# Patient Record
Sex: FEMALE | Race: WHITE | RURAL
[De-identification: names, ages, dates, MRNs, and addresses within clinical notes are randomized per-mention and may not be internally consistent; named-entity substitution may affect disease eponyms.]

---

## 2017-01-09 ENCOUNTER — OFFICE VISIT (OUTPATIENT)
Dept: INTERNAL MEDICINE CLINIC | Age: 32
End: 2017-01-09

## 2017-01-09 VITALS
SYSTOLIC BLOOD PRESSURE: 128 MMHG | TEMPERATURE: 96.9 F | OXYGEN SATURATION: 100 % | DIASTOLIC BLOOD PRESSURE: 91 MMHG | HEIGHT: 63 IN | BODY MASS INDEX: 20.09 KG/M2 | RESPIRATION RATE: 17 BRPM | WEIGHT: 113.4 LBS | HEART RATE: 145 BPM

## 2017-01-09 DIAGNOSIS — F90.9 ATTENTION DEFICIT HYPERACTIVITY DISORDER (ADHD), UNSPECIFIED ADHD TYPE: Chronic | ICD-10-CM

## 2017-01-09 RX ORDER — DIAZEPAM 2 MG/1
2 TABLET ORAL 2 TIMES DAILY
Qty: 60 TAB | Refills: 0 | Status: SHIPPED | OUTPATIENT
Start: 2017-01-09 | End: 2017-02-09 | Stop reason: SDUPTHER

## 2017-01-09 RX ORDER — DEXTROAMPHETAMINE SACCHARATE, AMPHETAMINE ASPARTATE, DEXTROAMPHETAMINE SULFATE AND AMPHETAMINE SULFATE 7.5; 7.5; 7.5; 7.5 MG/1; MG/1; MG/1; MG/1
30 TABLET ORAL 3 TIMES DAILY
Qty: 90 TAB | Refills: 0 | Status: SHIPPED | OUTPATIENT
Start: 2017-01-09 | End: 2017-02-09 | Stop reason: SDUPTHER

## 2017-01-09 NOTE — MR AVS SNAPSHOT
Visit Information Date & Time Provider Department Dept. Phone Encounter #  
 1/9/2017  1:30 PM Shannon Cruz NP [facility] [phone] [id number] Follow-up Instructions Return in about 2 months (around 3/9/2017) for ADHD. Upcoming Health Maintenance Date Due INFLUENZA AGE 9 TO ADULT 4/30/2017* Pneumococcal 19-64 Medium Risk (1 of 1 - PPSV23) 7/13/2017* DTaP/Tdap/Td series (1 - Tdap) 7/13/2017* PAP AKA CERVICAL CYTOLOGY 6/10/2018 *Topic was postponed. The date shown is not the original due date. Allergies as of 1/9/2017  Review Complete On: 1/9/2017 By: Shannon Cruz NP Severity Noted Reaction Type Reactions Latex  08/20/2015    Rash Lanolin High 09/09/2016    Hives Lamictal [Lamotrigine]  04/14/2010    Rash Tegretol [Carbamazepine]  03/18/2013    Rash Current Immunizations  Reviewed on 8/7/2014 No immunizations on file. Not reviewed this visit You Were Diagnosed With   
  
 Codes Comments Attention deficit hyperactivity disorder (ADHD), unspecified ADHD type     ICD-10-CM: F90.9 ICD-9-CM: 314.01 Vitals BP Pulse Temp Resp Height(growth percentile) Weight(growth percentile) (!) 128/91 (BP 1 Location: Left arm, BP Patient Position: Sitting) (!) 145 96.9 °F (36.1 °C) (Oral) 17 5' 3\" (1.6 m) 113 lb 6.4 oz (51.4 kg) SpO2 BMI OB Status Smoking Status 100% 20.09 kg/m2 Injection Current Some Day Smoker Vitals History BMI and BSA Data Body Mass Index Body Surface Area 20.09 kg/m 2 1.51 m 2 Preferred Pharmacy Pharmacy Name Phone [facility] [address] [facility], [address] [facility] [phone] Your Updated Medication List  
  
   
This list is accurate as of: 1/9/17  2:04 PM.  Always use your most recent med list.  
  
  
  
  
 betamethasone valerate 0.1 % ointment Commonly known as:  Marti Rucker  
 Apply  to affected area two (2) times a day. dextroamphetamine-amphetamine 30 mg tablet Commonly known as:  ADDERALL Take 1 Tab by mouth three (3) times daily. diazePAM 2 mg tablet Commonly known as:  VALIUM Take 1 Tab by mouth two (2) times a day. EPIDUO 0.1-2.5 % Gel Generic drug:  Adapalene-Benzoyl Peroxide 1 Tube by Apply Externally route once as needed for up to 1 dose. levocetirizine 5 mg tablet Commonly known as:  Figueroa GuÃ¡nica TAKE ONE TABLET BY MOUTH ONCE DAILY  
  
 multivitamin tablet Commonly known as:  ONE A DAY Take 1 Tab by mouth daily. pilocarpine 5 mg tablet Commonly known as:  Felicia Bentley Take 1 Tab by mouth three (3) times daily. Prescriptions Printed Refills  
 dextroamphetamine-amphetamine (ADDERALL) 30 mg tablet 0 Sig: Take 1 Tab by mouth three (3) times daily. Class: Print Route: Oral  
 diazePAM (VALIUM) 2 mg tablet 0 Sig: Take 1 Tab by mouth two (2) times a day. Class: Print Route: Oral  
  
Follow-up Instructions Return in about 2 months (around 3/9/2017) for ADHD. Introducing [facility] & HEALTH SERVICES! Dear Nyla Aguillon: Thank you for requesting a Runtastic account. Our records indicate that you already have an active Runtastic account. You can access your account anytime at [email or web address] Did you know that you can access your hospital and ER discharge instructions at any time in Runtastic? You can also review all of your test results from your hospital stay or ER visit. Additional Information If you have questions, please visit the Frequently Asked Questions section of the Runtastic website at [email or web address] Remember, Runtastic is NOT to be used for urgent needs. For medical emergencies, dial 911. Now available from your iPhone and Android! Please provide this summary of care documentation to your next provider. Your primary care clinician is listed as Jesse Guerrero. If you have any questions after today's visit, please call [phone].

## 2017-01-09 NOTE — PROGRESS NOTES
HISTORY OF PRESENT ILLNESS  Shahzad Galan is a 27 y.o. female. HPI  Chief Complaint   Patient presents with    Behavioral Problem     Medication Refill     Denies problems with taking medications. Denies weight loss or loss of appetite with taking medication. Elevated pulse  Patient admits to intake of marijuana. Denies chest pain or SOB. Review of Systems   Constitutional: Negative. Negative for chills and fever. HENT: Negative for congestion and ear pain. Eyes: Negative. Respiratory: Negative for cough and shortness of breath. Cardiovascular: Negative. Negative for chest pain and leg swelling. Gastrointestinal: Negative. Negative for heartburn, nausea and vomiting. Genitourinary: Negative. Negative for dysuria. Musculoskeletal: Negative for back pain, myalgias and neck pain. Skin: Negative. Neurological: Negative. Negative for headaches. Psychiatric/Behavioral: Negative. Physical Exam   Constitutional: She appears well-developed and well-nourished. No distress. HENT:   Head: Normocephalic and atraumatic. Eyes: Conjunctivae are normal.   Cardiovascular: Normal rate, regular rhythm, normal heart sounds and intact distal pulses. Exam reveals no gallop and no friction rub. No murmur heard. Pulmonary/Chest: Effort normal and breath sounds normal.   Skin: She is not diaphoretic. Nursing note and vitals reviewed. Body mass index is 20.09 kg/(m^2). Plan of care and AVS reviewed with patient who verbalized understanding. ASSESSMENT and PLAN    ICD-10-CM ICD-9-CM    1. Attention deficit hyperactivity disorder (ADHD), unspecified ADHD type F90.9 314.01 dextroamphetamine-amphetamine (ADDERALL) 30 mg tablet      diazePAM (VALIUM) 2 mg tablet      COMPLIANCE DRUG SCREEN/PRESCRIPTION MONITORING   Patient to give drug screen at next visit. Will check cmp at next visit.

## 2017-01-09 NOTE — PROGRESS NOTES
Chief Complaint   Patient presents with    Behavioral Problem     Medication Refill     1. Have you been to the ER, urgent care clinic since your last visit? Hospitalized since your last visit? No    2. Have you seen or consulted any other health care providers outside of the [facility] since your last visit? Include any pap smears or colon screening.  No

## 2017-01-14 LAB — DRUGS UR: NORMAL

## 2017-01-18 NOTE — PROGRESS NOTES
Patient was forthcoming about illegal drugs that would show in urine. Stated second urine check will be clean.

## 2017-01-31 ENCOUNTER — OFFICE VISIT (OUTPATIENT)
Dept: INTERNAL MEDICINE CLINIC | Age: 32
End: 2017-01-31

## 2017-01-31 VITALS
BODY MASS INDEX: 21.33 KG/M2 | SYSTOLIC BLOOD PRESSURE: 121 MMHG | DIASTOLIC BLOOD PRESSURE: 86 MMHG | HEIGHT: 63 IN | HEART RATE: 97 BPM | TEMPERATURE: 97.2 F | OXYGEN SATURATION: 98 % | WEIGHT: 120.4 LBS | RESPIRATION RATE: 18 BRPM

## 2017-01-31 NOTE — PROGRESS NOTES
Chief Complaint   Patient presents with    Cold Symptoms     nasal & chest congestion, losing voice     Morning meds taken this Barbra Healy LPN

## 2017-01-31 NOTE — MR AVS SNAPSHOT
Visit Information Date & Time Provider Department Dept. Phone Encounter #  
 1/31/2017 10:00 AM Lisseth Sifuentes NP [facility] [phone] [id number] Follow-up Instructions Return in about 5 weeks (around 3/9/2017) for physical exam. Upcoming Health Maintenance Date Due INFLUENZA AGE 9 TO ADULT 4/30/2017* Pneumococcal 19-64 Medium Risk (1 of 1 - PPSV23) 7/13/2017* DTaP/Tdap/Td series (1 - Tdap) 7/13/2017* PAP AKA CERVICAL CYTOLOGY 6/10/2018 *Topic was postponed. The date shown is not the original due date. Allergies as of 1/31/2017  Review Complete On: 1/31/2017 By: Lisseth Sifuentes NP Severity Noted Reaction Type Reactions Latex  08/20/2015    Rash Lanolin High 09/09/2016    Hives Lamictal [Lamotrigine]  04/14/2010    Rash Tegretol [Carbamazepine]  03/18/2013    Rash Current Immunizations  Reviewed on 8/7/2014 No immunizations on file. Not reviewed this visit You Were Diagnosed With   
  
 Codes Comments Cold    -  Primary ICD-10-CM: Javy Catching ICD-9-CM: 620 Vitals BP Pulse Temp Resp Height(growth percentile) Weight(growth percentile) 121/86 (BP 1 Location: Right arm, BP Patient Position: Sitting) 97 97.2 °F (36.2 °C) (Oral) 18 5' 3\" (1.6 m) 120 lb 6.4 oz (54.6 kg) SpO2 BMI OB Status Smoking Status 98% 21.33 kg/m2 Injection Current Some Day Smoker Vitals History BMI and BSA Data Body Mass Index Body Surface Area  
 21.33 kg/m 2 1.56 m 2 Preferred Pharmacy Pharmacy Name Phone [facility] [address] [facility], [address] [facility] [phone] Your Updated Medication List  
  
   
This list is accurate as of: 1/31/17 11:05 AM.  Always use your most recent med list. CHANG-SELTZER PLUS-D SINUS-COLD 2-30- mg Cap Generic drug:  cey-rndkxqhkg-cp-acetaminophen Take as directed  
  
 betamethasone valerate 0.1 % ointment Commonly known as:  Jhonny Nelson Apply  to affected area two (2) times a day. dextroamphetamine-amphetamine 30 mg tablet Commonly known as:  ADDERALL Take 1 Tab by mouth three (3) times daily. diazePAM 2 mg tablet Commonly known as:  VALIUM Take 1 Tab by mouth two (2) times a day. levocetirizine 5 mg tablet Commonly known as:  Vidhya Grumbles TAKE ONE TABLET BY MOUTH ONCE DAILY  
  
 multivitamin tablet Commonly known as:  ONE A DAY Take 1 Tab by mouth daily. pilocarpine 5 mg tablet Commonly known as:  Baljeet Jac Take 1 Tab by mouth three (3) times daily. Follow-up Instructions Return in about 5 weeks (around 3/9/2017) for physical exam.  
  
  
Introducing [facility] SERVICES! Dear Art Cordero: Thank you for requesting a Square account. Our records indicate that you already have an active Square account. You can access your account anytime at [email or web address] Did you know that you can access your hospital and ER discharge instructions at any time in Square? You can also review all of your test results from your hospital stay or ER visit. Additional Information If you have questions, please visit the Frequently Asked Questions section of the Square website at [email or web address] Remember, Square is NOT to be used for urgent needs. For medical emergencies, dial 911. Now available from your iPhone and Android! Please provide this summary of care documentation to your next provider. Your primary care clinician is listed as Adriana Rascon. If you have any questions after today's visit, please call [phone].

## 2017-01-31 NOTE — PROGRESS NOTES
HISTORY OF PRESENT ILLNESS  Virgil Galan is a 27 y.o. female. HPI  Chief Complaint   Patient presents with    Cold Symptoms     nasal & chest congestion, losing voice     States has been having Sx x 1 week. STates has not been using any OTC medications that have been effective. Review of Systems   Constitutional: Positive for chills. Negative for fever. HENT: Positive for congestion. Negative for ear pain and sore throat. Respiratory: Negative for cough. Gastrointestinal: Negative for diarrhea, nausea and vomiting. Physical Exam   Constitutional: She is oriented to person, place, and time. She appears well-developed and well-nourished. No distress. HENT:   Head: Normocephalic and atraumatic. Eyes: Conjunctivae are normal.   Cardiovascular: Normal rate, regular rhythm, normal heart sounds and intact distal pulses. Exam reveals no gallop and no friction rub. No murmur heard. Pulmonary/Chest: Effort normal and breath sounds normal. No respiratory distress. She has no wheezes. She has no rales. Musculoskeletal: Normal range of motion. Neurological: She is alert and oriented to person, place, and time. Skin: Skin is warm and dry. She is not diaphoretic. Nursing note and vitals reviewed. Plan of care and AVS reviewed with patient who verbalized understanding. ASSESSMENT and PLAN    ICD-10-CM ICD-9-CM    1. Cold J00 460 tsk-wewwvfqlj-rd-acetaminophen (CHANG-SELTZER PLUS-D SINUS-COLD) 2-30- mg cap   2. Normal body mass index (BMI) Z78.9 V49.89    Encouraged to keep well hydrated take chang-seltzer asprescribed.   F/u 2 months physical exam.

## 2017-02-09 DIAGNOSIS — F90.9 ATTENTION DEFICIT HYPERACTIVITY DISORDER (ADHD), UNSPECIFIED ADHD TYPE: Chronic | ICD-10-CM

## 2017-02-09 RX ORDER — DIAZEPAM 2 MG/1
2 TABLET ORAL 2 TIMES DAILY
Qty: 60 TAB | Refills: 0 | Status: SHIPPED | OUTPATIENT
Start: 2017-02-09 | End: 2017-03-09 | Stop reason: SDUPTHER

## 2017-02-09 RX ORDER — DEXTROAMPHETAMINE SACCHARATE, AMPHETAMINE ASPARTATE, DEXTROAMPHETAMINE SULFATE AND AMPHETAMINE SULFATE 7.5; 7.5; 7.5; 7.5 MG/1; MG/1; MG/1; MG/1
30 TABLET ORAL 3 TIMES DAILY
Qty: 90 TAB | Refills: 0 | Status: SHIPPED | OUTPATIENT
Start: 2017-02-09 | End: 2017-03-09 | Stop reason: SDUPTHER

## 2017-03-09 ENCOUNTER — OFFICE VISIT (OUTPATIENT)
Dept: INTERNAL MEDICINE CLINIC | Age: 32
End: 2017-03-09

## 2017-03-09 VITALS
WEIGHT: 122.2 LBS | TEMPERATURE: 97.2 F | HEIGHT: 63 IN | BODY MASS INDEX: 21.65 KG/M2 | HEART RATE: 66 BPM | DIASTOLIC BLOOD PRESSURE: 70 MMHG | SYSTOLIC BLOOD PRESSURE: 124 MMHG | RESPIRATION RATE: 18 BRPM | OXYGEN SATURATION: 100 %

## 2017-03-09 DIAGNOSIS — F90.8 ATTENTION-DEFICIT HYPERACTIVITY DISORDER, OTHER TYPE: ICD-10-CM

## 2017-03-09 DIAGNOSIS — Z00.00 PHYSICAL EXAM: Primary | ICD-10-CM

## 2017-03-09 RX ORDER — DEXTROAMPHETAMINE SACCHARATE, AMPHETAMINE ASPARTATE, DEXTROAMPHETAMINE SULFATE AND AMPHETAMINE SULFATE 7.5; 7.5; 7.5; 7.5 MG/1; MG/1; MG/1; MG/1
30 TABLET ORAL 3 TIMES DAILY
Qty: 90 TAB | Refills: 0 | Status: SHIPPED | OUTPATIENT
Start: 2017-03-09 | End: 2018-07-13 | Stop reason: SDUPTHER

## 2017-03-09 RX ORDER — DIAZEPAM 2 MG/1
2 TABLET ORAL DAILY
Qty: 30 TAB | Refills: 0 | Status: SHIPPED | OUTPATIENT
Start: 2017-03-09 | End: 2018-07-13 | Stop reason: SDUPTHER

## 2017-03-09 NOTE — PROGRESS NOTES
HISTORY OF PRESENT ILLNESS  Zain Galan is a 27 y.o. female. HPI  Chief Complaint   Patient presents with    Physical      No medical complaints. Requesting refills for medications. Has given urine for compliance today. Take santiago and vitamin c.  States is vegan and runs once weekly with yoga and stretching daily. Review of Systems   Constitutional: Negative. Negative for chills and fever. HENT: Negative. Negative for congestion, ear pain and sore throat. Eyes: Negative. Respiratory: Negative. Negative for cough, shortness of breath and wheezing. Cardiovascular: Negative. Negative for palpitations and leg swelling. Genitourinary:          Tenderness of breast related to menstrual cycle   Musculoskeletal: Negative for back pain and myalgias. Skin:        Has rash that is going away on right side of neck related to metal allergies. Neurological: Negative. Negative for dizziness. Psychiatric/Behavioral: Negative for suicidal ideas. Physical Exam   Constitutional: She is oriented to person, place, and time. She appears well-developed and well-nourished. No distress. HENT:   Head: Normocephalic and atraumatic. Cardiovascular: Normal rate, regular rhythm and normal heart sounds. Pulmonary/Chest: Effort normal and breath sounds normal.   Abdominal: Soft. She exhibits no distension. There is no tenderness. There is no rebound and no guarding. Neurological: She is alert and oriented to person, place, and time. She has normal reflexes. Skin: Skin is warm and dry. She is not diaphoretic. Positive for erythema right side of neck   Nursing note and vitals reviewed. Plan of care and AVS reviewed with patient who verbalized understanding. ASSESSMENT and PLAN    ICD-10-CM ICD-9-CM    1. Physical exam S48.51 M12.3 METABOLIC PANEL, COMPREHENSIVE      RI HANDLG&/OR CONVEY OF SPEC FOR TR OFFICE TO LAB      RI COLLECTION VENOUS BLOOD,VENIPUNCTURE   2.  Attention-deficit hyperactivity disorder, other type F90.8 314.01 COMPLIANCE DRUG SCREEN/PRESCRIPTION MONITORING      dextroamphetamine-amphetamine (ADDERALL) 30 mg tablet      diazePAM (VALIUM) 2 mg tablet      ME HANDLG&/OR CONVEY OF SPEC FOR TR OFFICE TO LAB      ME COLLECTION VENOUS BLOOD,VENIPUNCTURE   Will notify patient of lab results. F/U 2 months.

## 2017-03-09 NOTE — MR AVS SNAPSHOT
Visit Information Date & Time Provider Department Dept. Phone Encounter #  
 3/9/2017  9:30 AM Judi Rashid NP [facility] [phone] [id number] Follow-up Instructions Return in about 2 months (around 5/9/2017) for ADHD. Upcoming Health Maintenance Date Due Pneumococcal 19-64 Medium Risk (1 of 1 - PPSV23) 7/13/2017* DTaP/Tdap/Td series (1 - Tdap) 7/13/2017* PAP AKA CERVICAL CYTOLOGY 6/10/2018 *Topic was postponed. The date shown is not the original due date. Allergies as of 3/9/2017  Review Complete On: 3/9/2017 By: Judi Rashid NP Severity Noted Reaction Type Reactions Latex  08/20/2015    Rash Lanolin High 09/09/2016    Hives Lamictal [Lamotrigine]  04/14/2010    Rash Tegretol [Carbamazepine]  03/18/2013    Rash Current Immunizations  Reviewed on 8/7/2014 No immunizations on file. Not reviewed this visit You Were Diagnosed With   
  
 Codes Comments Physical exam    -  Primary ICD-10-CM: Z00.00 ICD-9-CM: V70.9 Attention-deficit hyperactivity disorder, other type     ICD-10-CM: F90.8 ICD-9-CM: 314.01 Attention deficit hyperactivity disorder (ADHD), unspecified ADHD type     ICD-10-CM: F90.9 ICD-9-CM: 314.01 Vitals BP Pulse Temp Resp Height(growth percentile) Weight(growth percentile) 124/70 (BP 1 Location: Left arm, BP Patient Position: Sitting) 66 97.2 °F (36.2 °C) (Oral) 18 5' 3\" (1.6 m) 122 lb 3.2 oz (55.4 kg) LMP SpO2 BMI OB Status Smoking Status 02/17/2017 (Exact Date) 100% 21.65 kg/m2 Having regular periods Current Some Day Smoker Vitals History BMI and BSA Data Body Mass Index Body Surface Area  
 21.65 kg/m 2 1.57 m 2 Preferred Pharmacy Pharmacy Name Phone [facility] [address] [facility], [facility] [address] [facility] [phone] Your Updated Medication List  
  
   
 This list is accurate as of: 3/9/17 10:12 AM.  Always use your most recent med list. CHANG-PRAVEENNAYELIER PLUS-D SINUS-COLD 2-30- mg Cap Generic drug:  xiy-gkvzmyfef-di-acetaminophen Take as directed  
  
 betamethasone valerate 0.1 % ointment Commonly known as:  Darrall Jennifer Apply  to affected area two (2) times a day. dextroamphetamine-amphetamine 30 mg tablet Commonly known as:  ADDERALL Take 1 Tab by mouth three (3) times daily. diazePAM 2 mg tablet Commonly known as:  VALIUM Take 1 Tab by mouth daily. Max Daily Amount: 2 mg.  
  
 levocetirizine 5 mg tablet Commonly known as:  Rochelle Roby TAKE ONE TABLET BY MOUTH ONCE DAILY  
  
 multivitamin tablet Commonly known as:  ONE A DAY Take 1 Tab by mouth daily. pilocarpine 5 mg tablet Commonly known as:  Voncile Dynes Take 1 Tab by mouth three (3) times daily. Prescriptions Printed Refills  
 dextroamphetamine-amphetamine (ADDERALL) 30 mg tablet 0 Sig: Take 1 Tab by mouth three (3) times daily. Class: Print Route: Oral  
 diazePAM (VALIUM) 2 mg tablet 0 Sig: Take 1 Tab by mouth daily. Max Daily Amount: 2 mg. Class: Print Route: Oral  
  
We Performed the Following [address] MONITORING [[id number] Custom] METABOLIC PANEL, COMPREHENSIVE [79048 CPT(R)] Follow-up Instructions Return in about 2 months (around 5/9/2017) for ADHD. Introducing \[facility]\"" & HEALTH SERVICES! Dear Keyla Kennedy: Thank you for requesting a GeoVario account. Our records indicate that you already have an active GeoVario account. You can access your account anytime at [email or web address] Did you know that you can access your hospital and ER discharge instructions at any time in GeoVario? You can also review all of your test results from your hospital stay or ER visit. Additional Information If you have questions, please visit the Frequently Asked Questions section of the [employer]ion The[employer] website at [email or web address] Remember, Social [employer] Beyond is NOT to be used for urgent needs. For medical emergencies, dial 911. Now available from your iPhone and Android! Please provide this summary of care documentation to your next provider. Your primary care clinician is listed as Joie Alanis. If you have any questions after today's visit, please call [phone].

## 2017-03-09 NOTE — PROGRESS NOTES
Chief Complaint   Patient presents with    Well Woman     1. Have you been to the ER, urgent care clinic since your last visit? Hospitalized since your last visit? No    2. Have you seen or consulted any other health care providers outside of the [facility] since your last visit? Include any pap smears or colon screening. No     There are no preventive care reminders to display for this patient. Patient refuses flu vaccine at present.

## 2017-03-15 LAB
ALBUMIN SERPL-MCNC: 4.8 G/DL (ref 3.5–5.5)
ALBUMIN/GLOB SERPL: 1.8 {RATIO} (ref 1.1–2.5)
ALP SERPL-CCNC: 81 IU/L (ref 39–117)
ALT SERPL-CCNC: 20 IU/L (ref 0–32)
AST SERPL-CCNC: 21 IU/L (ref 0–40)
BILIRUB SERPL-MCNC: <0.2 MG/DL (ref 0–1.2)
BUN SERPL-MCNC: 8 MG/DL (ref 6–20)
BUN/CREAT SERPL: 17 (ref 8–20)
CALCIUM SERPL-MCNC: 9.5 MG/DL (ref 8.7–10.2)
CHLORIDE SERPL-SCNC: 100 MMOL/L (ref 96–106)
CO2 SERPL-SCNC: 23 MMOL/L (ref 18–29)
CREAT SERPL-MCNC: 0.47 MG/DL (ref 0.57–1)
DRUGS UR: NORMAL
GLOBULIN SER CALC-MCNC: 2.7 G/DL (ref 1.5–4.5)
GLUCOSE SERPL-MCNC: 95 MG/DL (ref 65–99)
POTASSIUM SERPL-SCNC: 4.6 MMOL/L (ref 3.5–5.2)
PROT SERPL-MCNC: 7.5 G/DL (ref 6–8.5)
SODIUM SERPL-SCNC: 139 MMOL/L (ref 134–144)

## 2018-06-13 ENCOUNTER — OFFICE VISIT (OUTPATIENT)
Dept: INTERNAL MEDICINE CLINIC | Age: 33
End: 2018-06-13

## 2018-06-13 ENCOUNTER — HOSPITAL ENCOUNTER (OUTPATIENT)
Dept: LAB | Age: 33
Discharge: HOME | End: 2018-06-13
Payer: MEDICAID

## 2018-06-13 VITALS
HEIGHT: 63 IN | RESPIRATION RATE: 16 BRPM | SYSTOLIC BLOOD PRESSURE: 117 MMHG | DIASTOLIC BLOOD PRESSURE: 72 MMHG | OXYGEN SATURATION: 100 % | BODY MASS INDEX: 20.55 KG/M2 | WEIGHT: 116 LBS

## 2018-06-13 DIAGNOSIS — Z01.419 WELL WOMAN EXAM WITH ROUTINE GYNECOLOGICAL EXAM: ICD-10-CM

## 2018-06-13 DIAGNOSIS — Z72.0 TOBACCO ABUSE: ICD-10-CM

## 2018-06-13 DIAGNOSIS — F31.81 BIPOLAR 2 DISORDER (HCC): Chronic | ICD-10-CM

## 2018-06-13 DIAGNOSIS — Z30.09 FAMILY PLANNING: ICD-10-CM

## 2018-06-13 DIAGNOSIS — Z12.4 SCREENING FOR MALIGNANT NEOPLASM OF CERVIX: ICD-10-CM

## 2018-06-13 DIAGNOSIS — Z23 ENCOUNTER FOR IMMUNIZATION: ICD-10-CM

## 2018-06-13 DIAGNOSIS — Z00.00 ANNUAL PHYSICAL EXAM: Primary | ICD-10-CM

## 2018-06-13 DIAGNOSIS — Z20.2 EXPOSURE TO SEXUALLY TRANSMITTED DISEASE (STD): ICD-10-CM

## 2018-06-13 DIAGNOSIS — Z30.011 ENCOUNTER FOR INITIAL PRESCRIPTION OF CONTRACEPTIVE PILLS: ICD-10-CM

## 2018-06-13 DIAGNOSIS — F90.0 ATTENTION DEFICIT HYPERACTIVITY DISORDER (ADHD), PREDOMINANTLY INATTENTIVE TYPE: Chronic | ICD-10-CM

## 2018-06-13 LAB
HCG URINE, QL. (POC): NEGATIVE
VALID INTERNAL CONTROL?: YES

## 2018-06-13 PROCEDURE — 88175 CYTOPATH C/V AUTO FLUID REDO: CPT | Performed by: FAMILY MEDICINE

## 2018-06-13 RX ORDER — NORGESTIMATE AND ETHINYL ESTRADIOL 0.25-0.035
1 KIT ORAL DAILY
Qty: 1 PACKAGE | Refills: 11 | Status: SHIPPED | OUTPATIENT
Start: 2018-06-13 | End: 2022-11-01

## 2018-06-13 RX ORDER — VARENICLINE TARTRATE 1 MG/1
1 TABLET, FILM COATED ORAL 2 TIMES DAILY
Qty: 60 TAB | Refills: 2 | Status: SHIPPED | OUTPATIENT
Start: 2018-06-13 | End: 2018-07-18

## 2018-06-13 RX ORDER — VARENICLINE TARTRATE 25 MG
KIT ORAL
Qty: 1 DOSE PACK | Refills: 0 | Status: SHIPPED | OUTPATIENT
Start: 2018-06-13 | End: 2018-07-18

## 2018-06-13 NOTE — MR AVS SNAPSHOT
[address] [place] [address]. [address] 3643 [facility] 
[phone] Patient: Jose L Galan MRN: [id number] :1985 Visit Information Date & Time Provider Department Dept. Phone Encounter #  
 2018  3:30 PM Nory Anderson  Verona Soliman [id number] Follow-up Instructions Return in about 6 weeks (around 2018) for nurse visit. Upcoming Health Maintenance Date Due Pneumococcal 19-64 Medium Risk (1 of 1 - PPSV23) 10/26/2003 DTaP/Tdap/Td series (1 - Tdap) 10/26/2005 PAP AKA CERVICAL CYTOLOGY 6/10/2018 Influenza Age 5 to Adult 2018 Allergies as of 2018  Review Complete On: 2018 By: Mercy Odell LPN Severity Noted Reaction Type Reactions Latex  2015    Rash Lanolin High 2016    Hives Lamictal [Lamotrigine]  2010    Rash Tegretol [Carbamazepine]  2013    Rash Current Immunizations  Reviewed on 2014 Name Date Hep B Vaccine (Adult)  Incomplete Not reviewed this visit You Were Diagnosed With   
  
 Codes Comments Annual physical exam    -  Primary ICD-10-CM: Z00.00 ICD-9-CM: V70.0 Bipolar 2 disorder (HCC)     ICD-10-CM: F31.81 
ICD-9-CM: 296.89 Attention deficit hyperactivity disorder (ADHD), predominantly inattentive type     ICD-10-CM: F90.0 ICD-9-CM: 314.00 Tobacco abuse     ICD-10-CM: Z72.0 ICD-9-CM: 305.1 Family planning     ICD-10-CM: Z30.09 
ICD-9-CM: V25.09 Exposure to sexually transmitted disease (STD)     ICD-10-CM: Z20.2 ICD-9-CM: V01.6 Encounter for immunization     ICD-10-CM: V38 ICD-9-CM: V03.89 Vitals BP Resp Height(growth percentile) Weight(growth percentile) SpO2 BMI  
 117/72 (BP 1 Location: Left arm, BP Patient Position: Sitting) 16 5' 3\" (1.6 m) 116 lb (52.6 kg) 100% 20.55 kg/m2 OB Status Smoking Status Having regular periods Current Some Day Smoker BMI and BSA Data Body Mass Index Body Surface Area 20.55 kg/m 2 1.53 m 2 Preferred Pharmacy Pharmacy Name Phone [facility] [phone] [facility] [phone] Your Updated Medication List  
  
   
This list is accurate as of 6/13/18  4:49 PM.  Always use your most recent med list. CHANG-SELTZER PLUS-D SINUS-COLD 2-30- mg Cap Generic drug:  npq-xvownicni-os-acetaminophen Take as directed  
  
 betamethasone valerate 0.1 % ointment Commonly known as:  Deyanne Gill Apply  to affected area two (2) times a day. dextroamphetamine-amphetamine 30 mg tablet Commonly known as:  ADDERALL Take 1 Tab by mouth three (3) times daily. diazePAM 2 mg tablet Commonly known as:  VALIUM Take 1 Tab by mouth daily. Max Daily Amount: 2 mg.  
  
 levocetirizine 5 mg tablet Commonly known as:  Massiel Martyr TAKE ONE TABLET BY MOUTH ONCE DAILY  
  
 multivitamin tablet Commonly known as:  ONE A DAY Take 1 Tab by mouth daily. norgestimate-ethinyl estradiol 0.25-35 mg-mcg Tab Commonly known as:  Benjamin Robinsons Take 1 Tab by mouth daily. pilocarpine 5 mg tablet Commonly known as:  Agustin Day Take 1 Tab by mouth three (3) times daily. * varenicline 0.5 mg (11)- 1 mg (42) Dspk Commonly known as:  [address] As directed * varenicline 1 mg tablet Commonly known as:  CHANTIX CONTINUING MONTH TINY Take 1 Tab by mouth two (2) times a day. * Notice: This list has 2 medication(s) that are the same as other medications prescribed for you. Read the directions carefully, and ask your doctor or other care provider to review them with you. Prescriptions Sent to Pharmacy Refills  
 varenicline (CHANTIX STARTING MONTH BOX) 0.5 mg (11)- 1 mg (42) DsPk 0 Sig: As directed  Class: Normal  
 Pharmacy: [facility] [address] [facility] [address] & [facility] Ph #: [phone]  
 varenicline (CHANTIX CONTINUING MONTH PAK) 1 mg tablet 2 Sig: Take 1 Tab by mouth two (2) times a day. Class: Normal  
 Pharmacy: [facility] [address] [facility] [facility] [address] Ph #: [phone] Route: Oral  
 norgestimate-ethinyl estradiol (ORTHO-CYCLEN, SPRINTEC) 0.25-35 mg-mcg tab 11 Sig: Take 1 Tab by mouth daily. Class: Normal  
 Pharmacy: [facility] [address] [facility] [facility] [address] & [facility] Ph #: [phone] Route: Oral  
  
We Performed the Following AMB POC URINE PREGNANCY TEST, VISUAL COLOR COMPARISON [43785 CPT(R)] CT/NG/T.VAGINALIS AMPLIFICATION [id number] CPT(R)] HEP B SURFACE AG [id number] CPT(R)] HEPATITIS B VACCINE, ADULT DOSAGE, IM [55470 CPT(R)] HEPATITIS C AB [75041 CPT(R)] HIV 2 AB W/REFL IB [86044 CPT(R)] PAP, IG, RFX HPV ASCUS (552912) [31278 CPT(R)] RPR [74542 CPT(R)] Follow-up Instructions Return in about 6 weeks (around 7/25/2018) for nurse visit. Patient Instructions Deciding About Using Medicines To Quit Smoking Deciding About Using Medicines To Quit Smoking What are the medicines you can use? Your doctor may prescribe varenicline (Chantix) or bupropion (Zyban). These medicines can help you cope with cravings for tobacco. They are pills that don't contain nicotine. You also can use nicotine replacement products. These do contain nicotine. There are many types. · Gum and lozenges slowly release nicotine into your mouth. · Patches stick to your skin. They slowly release nicotine into your bloodstream. 
· An inhaler has a vernon that contains nicotine. You breathe in a puff of nicotine vapor through your mouth and throat. · Nasal spray releases a mist that contains nicotine. What are key points about this decision? · Using medicines can double your chances of quitting smoking.  They can ease cravings and withdrawal symptoms. · Getting counseling along with using medicine can raise your chances of quitting even more. · If you smoke fewer than 5 cigarettes a day, you may not need medicines to help you quit smoking. · These medicines have less nicotine than cigarettes. And by itself, nicotine is not nearly as harmful as smoking. The tars, carbon monoxide, and other toxic chemicals in tobacco cause the harmful effects. · The side effects of nicotine replacement products depend on the type of product. For example, a patch can make your skin red and itchy. Medicines in pill form can make you sick to your stomach. They can also cause dry mouth and trouble sleeping. For most people, the side effects are not bad enough to make them stop using the products. Why might you choose to use medicines to quit smoking? · You have tried on your own to stop smoking, but you were not able to stop. · You smoke more than 5 cigarettes a day. · You want to increase your chances of quitting smoking. · You want to reduce your cravings and withdrawal symptoms. · You feel the benefits of medicine outweigh the side effects. Why might you choose not to use medicine? · You want to try quitting on your own by stopping all at once (\"cold turkey\"). · You want to cut back slowly on the number of cigarettes you smoke. · You smoke fewer than 5 cigarettes a day. · You do not like using medicine. · You feel the side effects of medicines outweigh the benefits. · You are worried about the cost of medicines. Your decision Thinking about the facts and your feelings can help you make a decision that is right for you. Be sure you understand the benefits and risks of your options, and think about what else you need to do before you make the decision. Where can you learn more? Go to http://francesca-cal.info/.  
Enter T370 in the search box to learn more about \"Deciding About Using Medicines To Quit Smoking. \" Current as of: March 20, 2017 Content Version: 11.4 © 5179-7820 BioIQ. Care instructions adapted under license by [employer]Cred (which disclaims liability or warranty for this information). If you have questions about a medical condition or this instruction, always ask your healthcare professional. [address] any warranty or liability for your use of this information. Combination Birth Control Pills: Care Instructions Your Care Instructions Combination birth control pills are used to prevent pregnancy. They give you a regular dose of the hormones estrogen and progestin. You take a hormone pill every day to prevent pregnancy. Birth control pills come in packs. The most common type has 3 weeks of hormone pills. Some packs have sugar pills (they do not contain any hormones) for the fourth week. During that fourth no-hormone week, you have your period. After the fourth week (28 days), you start a new pack. Some birth control pills are packaged in different ways. For example, some have hormone pills for the fourth week instead of sugar pills. Taking hormones for the entire month causes you to not have periods or to have fewer periods. Others are packaged so that you have a period every 3 months. Your doctor will tell you what type of pills you have. Follow-up care is a key part of your treatment and safety. Be sure to make and go to all appointments, and call your doctor if you are having problems. It's also a good idea to know your test results and keep a list of the medicines you take. How can you care for yourself at home? How do you take the pill? · Follow your doctor's instructions about when to start taking your pills. Use backup birth control, such as a condom, or don't have intercourse for 7 days after you start your pills. · Take your pills every day, at about the same time of day.  To help yourself do this, try to take them when you do something else every day, such as brushing your teeth. What if you forget to take a pill? Always read the label for specific instructions, or call your doctor. Here are some basic guidelines: · If you miss 1 hormone pill, take it as soon as you remember. Ask your doctor if you may need to use a backup birth control method, such as a condom, or not have intercourse. · If you miss 2 or more hormone pills, take one as soon as you remember you forgot them. Then read the pill label or call your doctor about instructions on how to take your missed pills. Use a backup method of birth control or don't have intercourse for 7 days. Pregnancy is more likely if you miss more than 1 pill. · If you had intercourse, you can use emergency contraception, such as the morning-after pill (Plan B). You can use emergency contraception for up to 5 days after having had intercourse, but it works best if you take it right away. What else do you need to know? · The pill has side effects. ¨ You may have very light or skipped periods. ¨ You may have bleeding between periods (spotting). This usually decreases after 3 to 4 months. ¨ You may have mood changes, less interest in sex, or weight gain. · The pill may reduce acne, heavy bleeding and cramping, and symptoms of premenstrual syndrome. · Check with your doctor before you use any other medicines, including over-the-counter medicines, vitamins, herbal products, and supplements. Birth control hormones may not work as well to prevent pregnancy when combined with other medicines. · The pill doesn't protect against sexually transmitted infection (STIs), such as herpes or HIV/AIDS. If you're not sure whether your sex partner might have an STI, use a condom to protect against disease. When should you call for help? Call your doctor now or seek immediate medical care if: 
? · You have severe belly pain. ? · You have signs of a blood clot, such as: 
¨ Pain in your calf, back of the knee, thigh, or groin. ¨ Redness and swelling in your leg or groin. ? · You have blurred vision or other problems seeing. ? · You have a severe headache. ? · You have severe trouble breathing. ? Watch closely for changes in your health, and be sure to contact your doctor if: 
? · You think you might be pregnant. ? · You think you may be depressed. ? · You think you may have been exposed to or have a sexually transmitted infection. Where can you learn more? Go to http://francesca-cal.info/. Enter N021 in the search box to learn more about \"Combination Birth Control Pills: Care Instructions. \" Current as of: March 16, 2017 Content Version: 11.4 © 1097-1870 Connectipity. Care instructions adapted under license by [employer]i (which disclaims liability or warranty for this information). If you have questions about a medical condition or this instruction, always ask your healthcare professional. [place] [address] any warranty or liability for your use of this information. Well Visit, Ages 25 to 48: Care Instructions Your Care Instructions Physical exams can help you stay healthy. Your doctor has checked your overall health and may have suggested ways to take good care of yourself. He or she also may have recommended tests. At home, you can help prevent illness with healthy eating, regular exercise, and other steps. Follow-up care is a key part of your treatment and safety. Be sure to make and go to all appointments, and call your doctor if you are having problems. It's also a good idea to know your test results and keep a list of the medicines you take. How can you care for yourself at home? · Reach and stay at a healthy weight. This will lower your risk for many problems, such as obesity, diabetes, heart disease, and high blood pressure. · Get at least 30 minutes of physical activity on most days of the week. Walking is a good choice. You also may want to do other activities, such as running, swimming, cycling, or playing tennis or team sports. Discuss any changes in your exercise program with your doctor. · Do not smoke or allow others to smoke around you. If you need help quitting, talk to your doctor about stop-smoking programs and medicines. These can increase your chances of quitting for good. · Talk to your doctor about whether you have any risk factors for sexually transmitted infections (STIs). Having one sex partner (who does not have STIs and does not have sex with anyone else) is a good way to avoid these infections. · Use birth control if you do not want to have children at this time. Talk with your doctor about the choices available and what might be best for you. · Protect your skin from too much sun. When you're outdoors from 10 a.m. to 4 p.m., stay in the shade or cover up with clothing and a hat with a wide brim. Wear sunglasses that block UV rays. Even when it's cloudy, put broad-spectrum sunscreen (SPF 30 or higher) on any exposed skin. · See a dentist one or two times a year for checkups and to have your teeth cleaned. · Wear a seat belt in the car. · Drink alcohol in moderation, if at all. That means no more than 2 drinks a day for men and 1 drink a day for women. Follow your doctor's advice about when to have certain tests. These tests can spot problems early. For everyone · Cholesterol. Have the fat (cholesterol) in your blood tested after age 21. Your doctor will tell you how often to have this done based on your age, family history, or other things that can increase your risk for heart disease. · Blood pressure. Have your blood pressure checked during a routine doctor visit. Your doctor will tell you how often to check your blood pressure based on your age, your blood pressure results, and other factors. · Vision. Talk with your doctor about how often to have a glaucoma test. 
· Diabetes. Ask your doctor whether you should have tests for diabetes. · Colon cancer. Have a test for colon cancer at age 48. You may have one of several tests. If you are younger than 48, you may need a test earlier if you have any risk factors. Risk factors include whether you already had a precancerous polyp removed from your colon or whether your parent, brother, sister, or child has had colon cancer. For women · Breast exam and mammogram. Talk to your doctor about when you should have a clinical breast exam and a mammogram. Medical experts differ on whether and how often women under 50 should have these tests. Your doctor can help you decide what is right for you. · Pap test and pelvic exam. Begin Pap tests at age 24. A Pap test is the best way to find cervical cancer. The test often is part of a pelvic exam. Ask how often to have this test. 
· Tests for sexually transmitted infections (STIs). Ask whether you should have tests for STIs. You may be at risk if you have sex with more than one person, especially if your partners do not wear condoms. For men · Tests for sexually transmitted infections (STIs). Ask whether you should have tests for STIs. You may be at risk if you have sex with more than one person, especially if you do not wear a condom. · Testicular cancer exam. Ask your doctor whether you should check your testicles regularly. · Prostate exam. Talk to your doctor about whether you should have a blood test (called a PSA test) for prostate cancer. Experts differ on whether and when men should have this test. Some experts suggest it if you are older than 39 and are -American or have a father or brother who got prostate cancer when he was younger than 72. When should you call for help?  
Watch closely for changes in your health, and be sure to contact your doctor if you have any problems or symptoms that concern you. Where can you learn more? Go to http://francesca-cal.info/. Enter P072 in the search box to learn more about \"Well Visit, Ages 25 to 48: Care Instructions. \" Current as of: May 12, 2017 Content Version: 11.4 © 7392-9606 Dacos Software. Care instructions adapted under license by [employer] (which disclaims liability or warranty for this information). If you have questions about a medical condition or this instruction, always ask your healthcare professional. [address] any warranty or liability for your use of this information. Introducing [facility]! Dear Michelle Marquez: Thank you for requesting a WorkHound account. Our records indicate that you already have an active WorkHound account. You can access your account anytime at [email or web address] Did you know that you can access your hospital and ER discharge instructions at any time in WorkHound? You can also review all of your test results from your hospital stay or ER visit. Additional Information If you have questions, please visit the Frequently Asked Questions section of the WorkHound website at [email or web address] Remember, WorkHound is NOT to be used for urgent needs. For medical emergencies, dial 911. Now available from your iPhone and Android! Please provide this summary of care documentation to your next provider. Your primary care clinician is listed as Erma Jimenez. If you have any questions after today's visit, please call [phone].

## 2018-06-13 NOTE — LETTER
6/19/2018 10:03 AM 
 
Ms. Emory Paulson Nightly [address] [place] [id number] Dear Emory Paulson Nightly: 
 
Your results are normal/stable. If not signed up, consider getting my chart to get your results on-line. We can help you to sign up Please find your most recent results below. Resulted Orders CT/NG/T.VAGINALIS AMPLIFICATION Result Value Ref Range C. trachomatis by MIKAELA Negative Negative N. gonorrhoeae by MIKAELA Negative Negative T. vaginalis by MIKAELA Negative Negative Narrative Performed at:  [address]  [id number] : Jackie Pineda MD, Phone:  [phone] HEP B SURFACE AG Result Value Ref Range Hep B surface Ag screen Negative Negative Narrative Performed at:  [address]  [id number] : Jackie Pineda MD, Phone:  [phone] HEPATITIS C AB Result Value Ref Range Hep C Virus Ab <0.1 0.0 - 0.9 s/co ratio Comment:  
                                     Negative:     < 0.8 Indeterminate: 0.8 - 0.9 Positive:     > 0.9 The CDC recommends that a positive HCV antibody result 
 be followed up with a HCV Nucleic Acid Amplification 
 test (120445). Narrative Performed at:  [address]  [id number] : Jackie Pineda MD, Phone:  [phone] HIV 2 AB W/REFL IB Result Value Ref Range HIV-2 Ab Screen Negative Negative Comment:  
   No detectable HIV-2 antibody by JOSE. Test performed with Bio-Rad GS HIV-2 EIA Kit. Narrative Performed at:  [address] [place] [address]  [id number] : Azul Blank PhD, Phone:  [phone] RPR Result Value Ref Range RPR Non Reactive Non Reactive Narrative Performed at:  [place] [address] [address]  [id number] : Ellyn Calloway MD, Phone:  [phone] AMB POC URINE PREGNANCY TEST, VISUAL COLOR COMPARISON Result Value Ref Range VALID INTERNAL CONTROL POC Yes HCG urine, Ql. (POC) Negative Negative Please call me if you have any questions: [phone] Sincerely, Elsi Mccarthy MD

## 2018-06-13 NOTE — PATIENT INSTRUCTIONS
Deciding About Using Medicines To Quit Smoking  Deciding About Using Medicines To Quit Smoking  What are the medicines you can use? Your doctor may prescribe varenicline (Chantix) or bupropion (Zyban). These medicines can help you cope with cravings for tobacco. They are pills that don't contain nicotine. You also can use nicotine replacement products. These do contain nicotine. There are many types. · Gum and lozenges slowly release nicotine into your mouth. · Patches stick to your skin. They slowly release nicotine into your bloodstream.  · An inhaler has a vernon that contains nicotine. You breathe in a puff of nicotine vapor through your mouth and throat. · Nasal spray releases a mist that contains nicotine. What are key points about this decision? · Using medicines can double your chances of quitting smoking. They can ease cravings and withdrawal symptoms. · Getting counseling along with using medicine can raise your chances of quitting even more. · If you smoke fewer than 5 cigarettes a day, you may not need medicines to help you quit smoking. · These medicines have less nicotine than cigarettes. And by itself, nicotine is not nearly as harmful as smoking. The tars, carbon monoxide, and other toxic chemicals in tobacco cause the harmful effects. · The side effects of nicotine replacement products depend on the type of product. For example, a patch can make your skin red and itchy. Medicines in pill form can make you sick to your stomach. They can also cause dry mouth and trouble sleeping. For most people, the side effects are not bad enough to make them stop using the products. Why might you choose to use medicines to quit smoking? · You have tried on your own to stop smoking, but you were not able to stop. · You smoke more than 5 cigarettes a day. · You want to increase your chances of quitting smoking. · You want to reduce your cravings and withdrawal symptoms.   · You feel the benefits of medicine outweigh the side effects. Why might you choose not to use medicine? · You want to try quitting on your own by stopping all at once (\"cold turkey\"). · You want to cut back slowly on the number of cigarettes you smoke. · You smoke fewer than 5 cigarettes a day. · You do not like using medicine. · You feel the side effects of medicines outweigh the benefits. · You are worried about the cost of medicines. Your decision  Thinking about the facts and your feelings can help you make a decision that is right for you. Be sure you understand the benefits and risks of your options, and think about what else you need to do before you make the decision. Where can you learn more? Go to http://francesca-cal.info/. Enter M356 in the search box to learn more about \"Deciding About Using Medicines To Quit Smoking. \"  Current as of: March 20, 2017  Content Version: 11.4  © 8006-9523 [employer]. Care instructions adapted under license by [employer] (which disclaims liability or warranty for this information). If you have questions about a medical condition or this instruction, always ask your healthcare professional. [place] [address] any warranty or liability for your use of this information. Combination Birth Control Pills: Care Instructions  Your Care Instructions    Combination birth control pills are used to prevent pregnancy. They give you a regular dose of the hormones estrogen and progestin. You take a hormone pill every day to prevent pregnancy. Birth control pills come in packs. The most common type has 3 weeks of hormone pills. Some packs have sugar pills (they do not contain any hormones) for the fourth week. During that fourth no-hormone week, you have your period. After the fourth week (28 days), you start a new pack. Some birth control pills are packaged in different ways.  For example, some have hormone pills for the fourth week instead of sugar pills. Taking hormones for the entire month causes you to not have periods or to have fewer periods. Others are packaged so that you have a period every 3 months. Your doctor will tell you what type of pills you have. Follow-up care is a key part of your treatment and safety. Be sure to make and go to all appointments, and call your doctor if you are having problems. It's also a good idea to know your test results and keep a list of the medicines you take. How can you care for yourself at home? How do you take the pill? · Follow your doctor's instructions about when to start taking your pills. Use backup birth control, such as a condom, or don't have intercourse for 7 days after you start your pills. · Take your pills every day, at about the same time of day. To help yourself do this, try to take them when you do something else every day, such as brushing your teeth. What if you forget to take a pill? Always read the label for specific instructions, or call your doctor. Here are some basic guidelines:  · If you miss 1 hormone pill, take it as soon as you remember. Ask your doctor if you may need to use a backup birth control method, such as a condom, or not have intercourse. · If you miss 2 or more hormone pills, take one as soon as you remember you forgot them. Then read the pill label or call your doctor about instructions on how to take your missed pills. Use a backup method of birth control or don't have intercourse for 7 days. Pregnancy is more likely if you miss more than 1 pill. · If you had intercourse, you can use emergency contraception, such as the morning-after pill (Plan B). You can use emergency contraception for up to 5 days after having had intercourse, but it works best if you take it right away. What else do you need to know? · The pill has side effects. ¨ You may have very light or skipped periods. ¨ You may have bleeding between periods (spotting).  This usually decreases after 3 to 4 months. ¨ You may have mood changes, less interest in sex, or weight gain. · The pill may reduce acne, heavy bleeding and cramping, and symptoms of premenstrual syndrome. · Check with your doctor before you use any other medicines, including over-the-counter medicines, vitamins, herbal products, and supplements. Birth control hormones may not work as well to prevent pregnancy when combined with other medicines. · The pill doesn't protect against sexually transmitted infection (STIs), such as herpes or HIV/AIDS. If you're not sure whether your sex partner might have an STI, use a condom to protect against disease. When should you call for help? Call your doctor now or seek immediate medical care if:  ? · You have severe belly pain. ? · You have signs of a blood clot, such as:  ¨ Pain in your calf, back of the knee, thigh, or groin. ¨ Redness and swelling in your leg or groin. ? · You have blurred vision or other problems seeing. ? · You have a severe headache. ? · You have severe trouble breathing. ? Watch closely for changes in your health, and be sure to contact your doctor if:  ? · You think you might be pregnant. ? · You think you may be depressed. ? · You think you may have been exposed to or have a sexually transmitted infection. Where can you learn more? Go to http://francesca-cal.info/. Enter H321 in the search box to learn more about \"Combination Birth Control Pills: Care Instructions. \"  Current as of: March 16, 2017  Content Version: 11.4  © 6295-0492 [employer]. Care instructions adapted under license by [employer] (which disclaims liability or warranty for this information). If you have questions about a medical condition or this instruction, always ask your healthcare professional. [place] [address] any warranty or liability for your use of this information.        Well Visit, Ages 25 to 48: Care Instructions  Your Care Instructions    Physical exams can help you stay healthy. Your doctor has checked your overall health and may have suggested ways to take good care of yourself. He or she also may have recommended tests. At home, you can help prevent illness with healthy eating, regular exercise, and other steps. Follow-up care is a key part of your treatment and safety. Be sure to make and go to all appointments, and call your doctor if you are having problems. It's also a good idea to know your test results and keep a list of the medicines you take. How can you care for yourself at home? · Reach and stay at a healthy weight. This will lower your risk for many problems, such as obesity, diabetes, heart disease, and high blood pressure. · Get at least 30 minutes of physical activity on most days of the week. Walking is a good choice. You also may want to do other activities, such as running, swimming, cycling, or playing tennis or team sports. Discuss any changes in your exercise program with your doctor. · Do not smoke or allow others to smoke around you. If you need help quitting, talk to your doctor about stop-smoking programs and medicines. These can increase your chances of quitting for good. · Talk to your doctor about whether you have any risk factors for sexually transmitted infections (STIs). Having one sex partner (who does not have STIs and does not have sex with anyone else) is a good way to avoid these infections. · Use birth control if you do not want to have children at this time. Talk with your doctor about the choices available and what might be best for you. · Protect your skin from too much sun. When you're outdoors from 10 a.m. to 4 p.m., stay in the shade or cover up with clothing and a hat with a wide brim. Wear sunglasses that block UV rays. Even when it's cloudy, put broad-spectrum sunscreen (SPF 30 or higher) on any exposed skin.   · See a dentist one or two times a year for checkups and to have your teeth cleaned. · Wear a seat belt in the car. · Drink alcohol in moderation, if at all. That means no more than 2 drinks a day for men and 1 drink a day for women. Follow your doctor's advice about when to have certain tests. These tests can spot problems early. For everyone  · Cholesterol. Have the fat (cholesterol) in your blood tested after age 21. Your doctor will tell you how often to have this done based on your age, family history, or other things that can increase your risk for heart disease. · Blood pressure. Have your blood pressure checked during a routine doctor visit. Your doctor will tell you how often to check your blood pressure based on your age, your blood pressure results, and other factors. · Vision. Talk with your doctor about how often to have a glaucoma test.  · Diabetes. Ask your doctor whether you should have tests for diabetes. · Colon cancer. Have a test for colon cancer at age 48. You may have one of several tests. If you are younger than 48, you may need a test earlier if you have any risk factors. Risk factors include whether you already had a precancerous polyp removed from your colon or whether your parent, brother, sister, or child has had colon cancer. For women  · Breast exam and mammogram. Talk to your doctor about when you should have a clinical breast exam and a mammogram. Medical experts differ on whether and how often women under 50 should have these tests. Your doctor can help you decide what is right for you. · Pap test and pelvic exam. Begin Pap tests at age 24. A Pap test is the best way to find cervical cancer. The test often is part of a pelvic exam. Ask how often to have this test.  · Tests for sexually transmitted infections (STIs). Ask whether you should have tests for STIs. You may be at risk if you have sex with more than one person, especially if your partners do not wear condoms.   For men  · Tests for sexually transmitted infections (STIs). Ask whether you should have tests for STIs. You may be at risk if you have sex with more than one person, especially if you do not wear a condom. · Testicular cancer exam. Ask your doctor whether you should check your testicles regularly. · Prostate exam. Talk to your doctor about whether you should have a blood test (called a PSA test) for prostate cancer. Experts differ on whether and when men should have this test. Some experts suggest it if you are older than 39 and are -American or have a father or brother who got prostate cancer when he was younger than 72. When should you call for help? Watch closely for changes in your health, and be sure to contact your doctor if you have any problems or symptoms that concern you. Where can you learn more? Go to http://francesca-cal.info/. Enter P072 in the search box to learn more about \"Well Visit, Ages 25 to 48: Care Instructions. \"  Current as of: May 12, 2017  Content Version: 11.4  © 3109-8630 [employer]. Care instructions adapted under license by [employer] (which disclaims liability or warranty for this information). If you have questions about a medical condition or this instruction, always ask your healthcare professional. [place] [address] any warranty or liability for your use of this information.

## 2018-06-13 NOTE — PROGRESS NOTES
Chief Complaint   Patient presents with    Physical     I have reviewed the patient's medical history in detail and updated the computerized patient record. Health Maintenance reviewed. 1. Have you been to the ER, urgent care clinic since your last visit? Hospitalized since your last visit? yes    2. Have you seen or consulted any other health care providers outside of the [address] [place] [address] since your last visit? Include any pap smears or colon screening. Yes  [facility]    Encouraged pt to discuss pt's wishes with spouse/partner/family and bring them in the next appt to follow thru with the Advanced Directive    Fall Risk Assessment, last 12 mths 6/13/2018   Able to walk? Yes   Fall in past 12 months? No       PHQ over the last two weeks 6/13/2018   PHQ Not Done -   Little interest or pleasure in doing things Several days   Feeling down, depressed or hopeless Several days   Total Score PHQ 2 2   Trouble falling or staying asleep, or sleeping too much -   Feeling tired or having little energy -   Poor appetite or overeating -   Feeling bad about yourself - or that you are a failure or have let yourself or your family down -   Trouble concentrating on things such as school, work, reading or watching TV -   Moving or speaking so slowly that other people could have noticed; or the opposite being so fidgety that others notice -   Thoughts of being better off dead, or hurting yourself in some way -   PHQ 9 Score -       Abuse Screening Questionnaire 6/13/2018   Do you ever feel afraid of your partner? N   Are you in a relationship with someone who physically or mentally threatens you? N   Is it safe for you to go home?  Y       ADL Assessment 6/13/2018   Feeding yourself No Help Needed   Getting from bed to chair No Help Needed   Getting dressed No Help Needed   Bathing or showering No Help Needed   Walk across the room (includes cane/walker) No Help Needed   Using the telphone No Help Needed   Taking your medications No Help Needed   Preparing meals No Help Needed   Managing money (expenses/bills) No Help Needed   Moderately strenuous housework (laundry) No Help Needed   Shopping for personal items (toiletries/medicines) No Help Needed   Shopping for groceries No Help Needed   Driving No Help Needed   Climbing a flight of stairs No Help Needed   Getting to places beyond walking distances No Help Needed

## 2018-06-15 LAB
C TRACH RRNA SPEC QL NAA+PROBE: NEGATIVE
N GONORRHOEA RRNA SPEC QL NAA+PROBE: NEGATIVE
T VAGINALIS RRNA SPEC QL NAA+PROBE: NEGATIVE

## 2018-06-15 NOTE — PROGRESS NOTES
Subjective:   34 y.o. female for Well Woman Check. No LMP recorded. Social History: multiple partners, contraception - condoms. Does not always use, she is interested in hormonal contraception. Trying to quit smoking, nicotine patch broke her out. Gets her ADHD medications from her psychiatrist.  Pertinent past medical hstory: current smoker, no history of HTN, DVT, CAD, DM, liver disease, migraines. She desires to be checked for sexually transmitted infections. 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 No pertinent surgical history. Family History   Problem Relation Age of Onset    Diabetes Father     Hypertension Mother     Osteoporosis Paternal Grandmother     Diabetes Paternal Grandmother      Social History   Substance Use Topics    Smoking status: Current Some Day Smoker     Packs/day: 0.05     Years: 18.00     Types: Cigarettes     Last attempt to quit: 1/28/2013    Smokeless tobacco: Never Used    Alcohol use Yes      Comment: Occasionally        ROS:  Feeling well. No dyspnea or chest pain on exertion. No abdominal pain, change in bowel habits, black or bloody stools. No urinary tract symptoms. GYN ROS: normal menses, no abnormal bleeding, pelvic pain or discharge. No neurological complaints. Moods been okay, no suicidal ideation.     Objective:     Visit Vitals    /72 (BP 1 Location: Left arm, BP Patient Position: Sitting)    Resp 16    Ht 5' 3\" (1.6 m)    Wt 116 lb (52.6 kg)    SpO2 100%    BMI 20.55 kg/m2     The patient appears well, alert, oriented x 3, in no distress. ENT normal.  Neck supple. No adenopathy or thyromegaly. LOI. Lungs are clear, good air entry, no wheezes, rhonchi or rales. S1 and S2 normal, no murmurs, regular rate and rhythm. Abdomen soft without tenderness, guarding, mass or organomegaly. Extremities show no edema, normal peripheral pulses. Neurological is normal, no focal findings. BREAST EXAM: Deferred    PELVIC EXAM: normal external genitalia, vulva, vagina, cervix, uterus and adnexa    Assessment/Plan:   well woman  pap smear  counseled on STD prevention, HIV risk factors and prevention and use and side effects of OCP's      Encounter Diagnoses   Name Primary?  Annual physical exam Yes    B[facility] 2 disorder ([facility] [address]     Attention deficit hyperactivity disorder (ADHD), predominantly inattentive type     Tobacco abuse     Family planning     Exposure to sexually transmitted disease (STD)     Encounter for immunization     Well woman exam with routine gynecological exam     Comment: [V72.31]    Screening for malignant neoplasm of cervix     Encounter for initial prescription of contraceptive pills      Orders Placed This Encounter    Hepatitis B vaccine, adult dosage, IM    CT/NG/T.VAGINALIS AMPLIFICATION    HEP B SURFACE AG    HEPATITIS C AB    HIV 2 AB W/REFL IB    RPR    AMB POC URINE PREGNANCY TEST, VISUAL COLOR COMPARISON    varenicline (CHANTIX STARTING MONTH BOX) 0.5 mg (11)- 1 mg (42) DsPk    varenicline (CHANTIX CONTINUING MONTH TINY) 1 mg tablet    norgestimate-ethinyl estradiol (ORTHO-CYCLEN, SPRINTEC) 0.25-35 mg-mcg tab    PAP, IG, RFX HPV ASCUS ([id number])     The patient was counseled on the dangers of tobacco use, and was advised to quit. Reviewed strategies to maximize success, including pharmacotherapy (chantix). Follow-up Disposition:  Return in about 6 weeks (around 7/25/2018) for nurse visit. \

## 2018-06-19 LAB
HBV SURFACE AG SERPL QL IA: NEGATIVE
HCV AB S/CO SERPL IA: <0.1 S/CO RATIO (ref 0–0.9)
HIV 2 AB SERPL QL IA: NEGATIVE
RPR SER QL: NON REACTIVE

## 2018-06-25 ENCOUNTER — PATIENT MESSAGE (OUTPATIENT)
Dept: INTERNAL MEDICINE CLINIC | Age: 33
End: 2018-06-25

## 2018-07-13 ENCOUNTER — OFFICE VISIT (OUTPATIENT)
Dept: INTERNAL MEDICINE CLINIC | Age: 33
End: 2018-07-13

## 2018-07-13 VITALS
RESPIRATION RATE: 24 BRPM | BODY MASS INDEX: 20.59 KG/M2 | DIASTOLIC BLOOD PRESSURE: 76 MMHG | WEIGHT: 116.2 LBS | HEART RATE: 86 BPM | TEMPERATURE: 98.3 F | HEIGHT: 63 IN | OXYGEN SATURATION: 99 % | SYSTOLIC BLOOD PRESSURE: 107 MMHG

## 2018-07-13 DIAGNOSIS — L73.9 FOLLICULITIS: Primary | ICD-10-CM

## 2018-07-13 DIAGNOSIS — F31.81 BIPOLAR 2 DISORDER (HCC): Chronic | ICD-10-CM

## 2018-07-13 DIAGNOSIS — F90.8 ATTENTION-DEFICIT HYPERACTIVITY DISORDER, OTHER TYPE: ICD-10-CM

## 2018-07-13 RX ORDER — CEPHALEXIN 500 MG/1
1 TABLET ORAL 3 TIMES DAILY
Qty: 21 TAB | Refills: 0 | Status: SHIPPED | OUTPATIENT
Start: 2018-07-13 | End: 2018-07-20

## 2018-07-13 RX ORDER — DIAZEPAM 2 MG/1
2 TABLET ORAL
Qty: 20 TAB | Refills: 0 | Status: SHIPPED | OUTPATIENT
Start: 2018-07-13 | End: 2022-11-01 | Stop reason: ALTCHOICE

## 2018-07-13 RX ORDER — DEXTROAMPHETAMINE SACCHARATE, AMPHETAMINE ASPARTATE, DEXTROAMPHETAMINE SULFATE AND AMPHETAMINE SULFATE 7.5; 7.5; 7.5; 7.5 MG/1; MG/1; MG/1; MG/1
30 TABLET ORAL 3 TIMES DAILY
Qty: 30 TAB | Refills: 0 | Status: SHIPPED | OUTPATIENT
Start: 2018-07-13

## 2018-07-13 NOTE — PATIENT INSTRUCTIONS
Must call Jh to let him know about me giving you short term medicine until you can see him. I will not continue to refill those meds.

## 2018-07-13 NOTE — MR AVS SNAPSHOT
[address] 3013 [facility] 
[phone] Patient: Suzi Rinne Nightly MRN: [id number] :[id number] Visit Information Date & Time Provider Department Dept. Phone Encounter #  
 2018 11:30 AM MD Shahla Barnett [phone] Follow-up Instructions Return if symptoms worsen or fail to improve. Your Appointments 2018  2:30 PM  
Nurse Visit with NURSE TPC Shahla Miller (YANIV SCHEDULING) Appt Note: hep shot $0 cp lsh 18  
 [address] [place] [address] Road. [address] Tessa Agarwal [address]  
[phone]  
  
   
 [address] [place] [address] [address] Upcoming Health Maintenance Date Due Pneumococcal 19-64 Medium Risk (1 of 1 - PPSV23) 10/26/2003 DTaP/Tdap/Td series (1 - Tdap) 10/26/2005 Influenza Age 5 to Adult 2018 PAP AKA CERVICAL CYTOLOGY 2021 Allergies as of 2018  Review Complete On: 2018 By: Lakeisha Barber MD  
  
 Severity Noted Reaction Type Reactions Latex  2015    Rash Lanolin High 2016    Hives Lamictal [Lamotrigine]  2010    Rash Tegretol [Carbamazepine]  2013    Rash Current Immunizations  Reviewed on 2014 Name Date Hep B Vaccine (Adult) 2018 Not reviewed this visit You Were Diagnosed With   
  
 Codes Comments Folliculitis    -  Primary ICD-10-CM: L73.9 ICD-9-CM: 704.8 Attention-deficit hyperactivity disorder, other type     ICD-10-CM: F90.8 ICD-9-CM: 314.01 Bipolar 2 disorder (HCC)     ICD-10-CM: F31.81 
ICD-9-CM: 296.89 Vitals BP Pulse Temp Resp Height(growth percentile) Weight(growth percentile) 107/76 (BP 1 Location: Right arm, BP Patient Position: Sitting) 86 98.3 °F (36.8 °C) (Oral) 24 5' 3\" (1.6 m) 116 lb 3.2 oz (52.7 kg) LMP SpO2 BMI OB Status Smoking Status 07/13/2018 (Exact Date) 99% 20.58 kg/m2 Having regular periods Current Some Day Smoker Vitals History BMI and BSA Data Body Mass Index Body Surface Area 20.58 kg/m 2 1.53 m 2 Preferred Pharmacy Pharmacy Name Phone [facility] [phone] [facility] [phone] Your Updated Medication List  
  
   
This list is accurate as of 7/13/18 12:16 PM.  Always use your most recent med list. CHANG-SELTZER PLUS-D SINUS-COLD 2-30- mg Cap Generic drug:  dxt-cqhpbkzgw-pg-acetaminophen Take as directed  
  
 betamethasone valerate 0.1 % ointment Commonly known as:  Mino Stack Apply  to affected area two (2) times a day. cephalexin 500 mg Tab Take 1 Tab by mouth three (3) times daily for 7 days. dextroamphetamine-amphetamine 30 mg tablet Commonly known as:  ADDERALL Take 1 Tab by mouth three (3) times daily. diazePAM 2 mg tablet Commonly known as:  VALIUM Take 1 Tab by mouth every twelve (12) hours as needed. Max Daily Amount: 4 mg.  
  
 levocetirizine 5 mg tablet Commonly known as:  Violet Tonya TAKE ONE TABLET BY MOUTH ONCE DAILY  
  
 multivitamin tablet Commonly known as:  ONE A DAY Take 1 Tab by mouth daily. norgestimate-ethinyl estradiol 0.25-35 mg-mcg Tab Commonly known as:  Yelena Bulla Take 1 Tab by mouth daily. pilocarpine 5 mg tablet Commonly known as:  Dickie Edilson Take 1 Tab by mouth three (3) times daily. * varenicline 0.5 mg (11)- 1 mg (42) [facility] Common[facility]:  [facility] As directed * varenicline 1 mg tablet Commonly known as:  CHANTIX CONTINUING MONTH TINY Take 1 Tab by mouth two (2) times a day. * Notice: This list has 2 medication(s) that are the same as other medications prescribed for you. Read the directions carefully, and ask your doctor or other care provider to review them with you. Prescriptions Printed Refills  
 dextroamphetamine-amphetamine (ADDERALL) 30 mg tablet 0 Sig: Take 1 Tab by mouth three (3) times daily. Class: Print Route: Oral  
 diazePAM (VALIUM) 2 mg tablet 0 Sig: Take 1 Tab by mouth every twelve (12) hours as needed. Max Daily Amount: 4 mg. Class: Print Route: Oral  
  
Prescriptions Sent to Pharmacy Refills  
 cephalexin 500 mg tab 0 Sig: Take 1 Tab by mouth three (3) times daily for 7 days. Class: Normal  
 Pharmacy: [address] #: [phone] Route: Oral  
  
Follow-up Instructions Return if symptoms worsen or fail to improve. Patient Instructions Must call Jh to let him know about me giving you short term medicine until you can see him. I will not continue to refill those meds. Introducing \[facility] [place] [facility]\"" & [facility] SERVICES! Dear Mignon Terrazas: Thank you for requesting a [employer] account. Our records indicate that you already have an active [employer] account. You can access your account anytime at [email or web address] Did you know that you can access your hospital and ER discharge instructions at any time in [employer]? You can also review all of your test results from your hospital stay or ER visit. Additional Information If you have questions, please visit the Frequently Asked Questions section of the [employer] website at [email or web address] Remember, [employer] is NOT to be used for urgent needs. For medical emergencies, dial 911. Now available from your iPhone and Android! Please provide this summary of care documentation to your next provider. Your primary care clinician is listed as Vito Langston. If you have any questions after today's visit, please call [phone].

## 2018-07-13 NOTE — PROGRESS NOTES
Chief Complaint   Patient presents with    Rash     X 1 WEEK; ALL OVER BODY    Medication Refill     PSYCHIATRIC MEDICATION     1. Have you been to the ER, urgent care clinic since your last visit? Hospitalized since your last visit? No    2. Have you seen or consulted any other health care providers outside of the [facility] since your last visit? Include any pap smears or colon screening.  No    Tried Dark Matter CBD Antiseptic Soap    Health Maintenance Due   Topic Date Due    Pneumococcal 19-64 Medium Risk (1 of 1 - PPSV23) 10/26/2003    DTaP/Tdap/Td series (1 - Tdap) 10/26/2005
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
## 2018-07-18 ENCOUNTER — OFFICE VISIT (OUTPATIENT)
Dept: INTERNAL MEDICINE CLINIC | Age: 33
End: 2018-07-18

## 2018-07-18 VITALS
HEIGHT: 63 IN | BODY MASS INDEX: 20.38 KG/M2 | OXYGEN SATURATION: 100 % | DIASTOLIC BLOOD PRESSURE: 75 MMHG | SYSTOLIC BLOOD PRESSURE: 117 MMHG | TEMPERATURE: 98 F | WEIGHT: 115 LBS | HEART RATE: 87 BPM | RESPIRATION RATE: 18 BRPM
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
## 2018-07-18 DIAGNOSIS — T14.8XXA BRUISE: ICD-10-CM

## 2018-07-18 RX ORDER — DIAZEPAM 10 MG/1
10 TABLET ORAL 2 TIMES DAILY
COMMUNITY
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
## 2018-07-18 NOTE — PATIENT INSTRUCTIONS
Hematoma: Care Instructions  Your Care Instructions    A hematoma is a bad bruise. It happens when an injury causes blood to collect and pool under the skin. The pooling blood gives the skin a spongy, rubbery, lumpy feel. A hematoma usually is not a cause for concern. It is not the same thing as a blood clot in a vein, and it does not cause blood clots. Follow-up care is a key part of your treatment and safety. Be sure to make and go to all appointments, and call your doctor if you are having problems. It's also a good idea to know your test results and keep a list of the medicines you take. How can you care for yourself at home? · Rest and protect the bruised area. · Put ice or a cold pack on the area for 10 to 20 minutes at a time. · Prop up the bruised area on a pillow when you ice it or anytime you sit or lie down during the next 3 days. Try to keep it above the level of your heart. This will help reduce swelling. · Wrapping the bruised area with an elastic bandage such as an Ace wrap will help decrease swelling. Don't wrap it too tightly, as this can cause more swelling below the affected area. · Take an over-the-counter pain medicine, such as acetaminophen (Tylenol), ibuprofen (Advil, Motrin), or naproxen (Aleve). · Do not take two or more pain medicines at the same time unless the doctor told you to. Many pain medicines have acetaminophen, which is Tylenol. Too much acetaminophen (Tylenol) can be harmful. When should you call for help? Call your doctor now or seek immediate medical care if:    · You have signs of skin infection, such as:  ¨ Increased pain, swelling, warmth, or redness. ¨ Red streaks leading from the area. ¨ Pus draining from the area. ¨ A fever.    Watch closely for changes in your health, and be sure to contact your doctor if:    · The bruise lasts longer than 4 weeks.     · The bruise gets bigger or becomes more painful.     · You do not get better as expected.    Where can you learn more? Go to http://francesca-cal.info/. Enter P911 in the search box to learn more about \"Hematoma: Care Instructions. \"  Current as of: November 20, 2017  Content Version: 11.7  © 1467-9427 [employer]. Care instructions adapted under license by [employer] (which disclaims liability or warranty for this information). If you have questions about a medical condition or this instruction, always ask your healthcare professional. [place] [address] any warranty or liability for your use of this information. Bruises: Care Instructions  Your Care Instructions    Bruises occur when small blood vessels under the skin tear or rupture, most often from a twist, bump, or fall. Blood leaks into tissues under the skin and causes a black-and-blue spot that often turns colors, including purplish black, reddish blue, or yellowish green, as the bruise heals. Bruises hurt, but most are not serious and will go away on their own within 2 to 4 weeks. Sometimes, gravity causes them to spread down the body. A leg bruise usually will take longer to heal than a bruise on the face or arms. Follow-up care is a key part of your treatment and safety. Be sure to make and go to all appointments, and call your doctor if you are having problems. It's also a good idea to know your test results and keep a list of the medicines you take. How can you care for yourself at home? · Take pain medicines exactly as directed. ¨ If the doctor gave you a prescription medicine for pain, take it as prescribed. ¨ If you are not taking a prescription pain medicine, ask your doctor if you can take an over-the-counter medicine. · Put ice or a cold pack on the area for 10 to 20 minutes at a time. Put a thin cloth between the ice and your skin. · If you can, prop up the bruised area on pillows as much as possible for the next few days.  Try to keep the bruise above the level of your heart.  When should you call for help? Call your doctor now or seek immediate medical care if:    · You have signs of infection, such as:  ¨ Increased pain, swelling, warmth, or redness. ¨ Red streaks leading from the bruise. ¨ Pus draining from the bruise. ¨ A fever.     · You have a bruise on your leg and signs of a blood clot, such as:  ¨ Increasing redness and swelling along with warmth, tenderness, and pain in the bruised area. ¨ Pain in your calf, back of the knee, thigh, or groin. ¨ Redness and swelling in your leg or groin.     · Your pain gets worse.    Watch closely for changes in your health, and be sure to contact your doctor if:    · You do not get better as expected. Where can you learn more? Go to http://francesca-cal.info/. Enter [phone] in the search box to learn more about \"Bruises: Care Instructions. \"  Current as of: November 20, 2017  Content Version: 11.7  © 7421-1443 [employer]. Care instructions adapted under license by [employer] (which disclaims liability or warranty for this information). If you have questions about a medical condition or this instruction, always ask your healthcare professional. [place] [address] any warranty or liability for your use of this information.
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
## 2018-07-25 ENCOUNTER — CLINICAL SUPPORT (OUTPATIENT)
Dept: INTERNAL MEDICINE CLINIC | Age: 33
End: 2018-07-25

## 2018-07-25 DIAGNOSIS — Z23 ENCOUNTER FOR IMMUNIZATION: Primary | ICD-10-CM

## 2018-07-25 NOTE — PROGRESS NOTES
Chief Complaint   Patient presents with    Immunization/Injection     Hep B. immunization/s administered 7/25/2018 by Evie Maldonado LPN. Patient tolerated procedure well. No reactions noted. Patient was advised that the vaccine would cost $270.00 and it would have to be paid upfront. Patient was given permission per Li to pay $50/month and patient agreed. Second series of Hep B per Dr. Ronald Corey.

## 2018-07-25 NOTE — PATIENT INSTRUCTIONS
Vaccine Information Statement     Hepatitis B Vaccine: What You Need to Know    Many Vaccine Information Statements are available in Latvian and other languages. See www.immunize.org/vis. Hojas de información sobre vacunas están disponibles en español y en muchos otros idiomas. Visite www.immunize.org/vis    1. Why get vaccinated? Hepatitis B is a serious disease that affects the liver. It is caused by the hepatitis B virus. Hepatitis B can cause mild illness lasting a few weeks, or it can lead to a serious, lifelong illness. Hepatitis B virus infection can be either acute or chronic. Acute hepatitis B virus infection is a short-term illness that occurs within the first 6 months after someone is exposed to the hepatitis B virus. This can lead to:   fever, fatigue, loss of appetite, nausea, and/or vomiting   jaundice (yellow skin or eyes, dark urine, michael-colored bowel movements)   pain in muscles, joints, and stomach    Chronic hepatitis B virus infection is a long-term illness that occurs when the hepatitis B virus remains in a persons body. Most people who go on to develop chronic hepatitis B do not have symptoms, but it is still very serious and can lead to:   liver damage (cirrhosis)   liver cancer   death    Chronically-infected people can spread hepatitis B virus to others, even if they do not feel or look sick themselves. Up to 1.4 million people in the [place] may have chronic hepatitis B infection. About 90% of infants who get hepatitis B become chronically infected and about 1 out of 4 of them dies. Hepatitis B is spread when blood, semen, or other body fluid infected with the Hepatitis B virus enters the body of a person who is not infected.  People can become infected with the virus through:   Birth (a baby whose mother is infected can be infected at or after birth)  [employer] such as razors or toothbrushes with an infected person   Contact with the blood or open sores of an infected person   Sex with an infected partner   Sharing needles, syringes, or other drug-injection equipment   Exposure to blood from needlesticks or other sharp instruments    Each year about 2,000 people in the [facility] die from hepatitis B-related liver disease. Hepatitis B vaccine can prevent hepatitis B and its consequences, including liver cancer and cirrhosis. 2. Hepatitis B vaccine    Hepatitis B vaccine is made from parts of the hepatitis B virus. It cannot cause hepatitis B infection. The vaccine is usually given as 3 or 4 shots over a 6-month period. Infants should get their first dose of hepatitis B vaccine at birth and will usually complete the series at 7 months of age. All children and adolescents younger than 23years of age who have not yet gotten the vaccine should also be vaccinated. Hepatitis B vaccine is recommended for unvaccinated adults who are at risk for hepatitis B virus infection, including:   People whose sex partners have hepatitis B   Sexually active persons who are not in a long-term monogamous relationship   Persons seeking evaluation or treatment for a sexually transmitted disease   Men who have sexual contact with other men   People who share needles, syringes, or other drug-injection equipment   People who have household contact with someone infected with the hepatitis B virus  [address] care and public safety workers at risk for exposure to blood or body fluids    Residents and staff of facilities for developmentally disabled persons   Persons in correctional facilities   Victims of sexual assault or abuse   Travelers to regions with increased rates of hepatitis B   People with chronic liver disease, kidney disease, HIV infection, or diabetes   Anyone who wants to be protected from hepatitis B     There are no known risks to getting hepatitis B vaccine at the same time as other vaccines.     3. Some people should not get this vaccine. Tell the person who is giving the vaccine:     If the person getting the vaccine has any severe, life-threatening allergies. If you ever had a life-threatening allergic reaction after a dose of hepatitis B vaccine, or have a severe allergy to any part of this vaccine, you may be advised not to get vaccinated. Ask your health care provider if you want information about vaccine components.  If the person getting the vaccine is not feeling well. If you have a mild illness, such as a cold, you can probably get the vaccine today. If you are moderately or severely ill, you should probably wait until you recover. Your doctor can advise you. 4. Risks of a vaccine reaction    With any medicine, including vaccines, there is a chance of side effects. These are usually mild and go away on their own, but serious reactions are also possible. Most people who get hepatitis B vaccine do not have any problems with it. Minor problems following hepatitis B vaccine include:    soreness where the shot was given   temperature of 99.9°F or higher  If these problems occur, they usually begin soon after the shot and last 1 or 2 days. Your doctor can tell you more about these reactions. Other problems that could happen after this vaccine:     People sometimes faint after a medical procedure, including vaccination. Sitting or lying down for about 15 minutes can help prevent fainting and injuries caused by a fall. Tell your provider if you feel dizzy, or have vision changes or ringing in the ears.  Some people get shoulder pain that can be more severe and longer-lasting than the more routine soreness that can follow injections. This happens very rarely.  Any medication can cause a severe allergic reaction. Such reactions from a vaccine are very rare, estimated at about 1 in a million doses, and would happen within a few minutes to a few hours after the vaccination.     As with any medicine, there is a very remote chance of a vaccine causing a serious injury or death. The safety of vaccines is always being monitored. For more information, visit: www.cdc.gov/vaccinesafety/    5. What if there is a serious problem? What should I look for?  Look for anything that concerns you, such as signs of a severe allergic reaction, very high fever, or unusual behavior. Signs of a severe allergic reaction can include hives, swelling of the face and throat, difficulty breathing, a fast heartbeat, dizziness, and weakness. These would usually start a few minutes to a few hours after the vaccination. What should I do?  If you think it is a severe allergic reaction or other emergency that cant wait, call 9-1-1 and get to the nearest hospital. Otherwise, call your clinic. Afterward, the reaction should be reported to the Vaccine Adverse Event Reporting System (VAERS). Your doctor should file this report, or you can do it yourself through the VAERS web site at www.vaers. [facility].gov, or by calling 0-148.119.8749. VAERS does not give medical advice. 6. The National Vaccine Injury Compensation Program    The [facility] Vaccine Injury Compensation Program (VICP) is a federal program that was created to compensate people who may have been injured by certain vaccines. Persons who believe they may have been injured by a vaccine can learn about the program and about filing a claim by calling 0-605.258.2487 or visiting the [employer] website at www.[facility].gov/vaccinecompensation. There is a time limit to file a claim for compensation. 7. How can I learn more?  Ask your healthcare provider. He or she can give you the vaccine package insert or suggest other sources of information.  Call your local or state health department.    Contact the Centers for Disease Control and Prevention (CDC):  - Call 6-135.903.1221 (1-800-CDC-INFO) or  - Visit CDCs website at www.cdc.gov/vaccines    Vaccine Information Statement   Hepatitis B Vaccine  7/20/2016  42 U. S.C. § 300aa-26    [place]  Department of Health and Human Services  Centers for Disease Control and Prevention    Office Use Only

## 2022-10-27 ENCOUNTER — PATIENT MESSAGE (OUTPATIENT)
Dept: ORTHOPEDIC SURGERY | Age: 37
End: 2022-10-27

## 2022-10-27 ENCOUNTER — OFFICE VISIT (OUTPATIENT)
Dept: ORTHOPEDIC SURGERY | Age: 37
End: 2022-10-27
Payer: COMMERCIAL

## 2022-10-27 VITALS — WEIGHT: 137 LBS | BODY MASS INDEX: 24.27 KG/M2 | HEIGHT: 63 IN

## 2022-10-27 DIAGNOSIS — G89.29 CHRONIC BILATERAL LOW BACK PAIN WITH BILATERAL SCIATICA: ICD-10-CM

## 2022-10-27 DIAGNOSIS — M51.36 DDD (DEGENERATIVE DISC DISEASE), LUMBAR: Primary | ICD-10-CM

## 2022-10-27 DIAGNOSIS — M54.41 CHRONIC BILATERAL LOW BACK PAIN WITH BILATERAL SCIATICA: ICD-10-CM

## 2022-10-27 DIAGNOSIS — M54.42 CHRONIC BILATERAL LOW BACK PAIN WITH BILATERAL SCIATICA: ICD-10-CM

## 2022-10-27 PROCEDURE — 99204 OFFICE O/P NEW MOD 45 MIN: CPT | Performed by: STUDENT IN AN ORGANIZED HEALTH CARE EDUCATION/TRAINING PROGRAM

## 2022-10-27 RX ORDER — GABAPENTIN 300 MG/1
CAPSULE ORAL
Qty: 30 CAPSULE | Refills: 0 | Status: SHIPPED | OUTPATIENT
Start: 2022-10-27 | End: 2022-10-31 | Stop reason: SDUPTHER

## 2022-10-27 RX ORDER — DICLOFENAC SODIUM 75 MG/1
75 TABLET, DELAYED RELEASE ORAL 2 TIMES DAILY WITH MEALS
Qty: 60 TABLET | Refills: 0 | Status: SHIPPED | OUTPATIENT
Start: 2022-10-27 | End: 2022-10-31 | Stop reason: SDUPTHER

## 2022-10-27 NOTE — LETTER
CONTROLLED SUBSTANCE MEDICATION AGREEMENT  Patient Name: Agustín Galan  Patient YOB: 1985   [id number]    I understand, that controlled substance medications may be used to help better manage my symptoms and to improve my ability to function at home, work and in social settings. However, I also understand that these medications do have risks, which have been discussed with me, including possible development of physical or psychological dependence. I understand that successful treatment requires mutual trust and honesty between me and my provider. I understand and agree that following this Medication Agreement is necessary in continuing my provider-patient relationship and the success of my treatment plan. Explanation from my Provider: Benefits and Goals of Controlled Substance Medications: There are two potential goals for your treatment: (1) decreased pain and suffering (2) improved daily life functions. There are many possible treatments for your chronic condition(s). Alternatives such as physical therapy, yoga, massage, home daily exercise, meditation, relaxation techniques, injections, chiropractic manipulations, surgery, cognitive therapy, hypnosis and many medications that are not habit-forming may be used. Use of controlled substance medications may be helpful, but they are unlikely to resolve all symptoms or restore all function. Explanation from my Provider: Risks of Controlled Substance Medications:   Opioid pain medications: These medications can lead to problems such as addiction/dependence, sedation, lightheadedness/dizziness, memory issues, falls, constipation, nausea, or vomiting. They may also impair the ability to drive or operate machinery. Additionally, these medications may lower testosterone levels, leading to loss of bone strength, stamina and sex drive.   They may cause problems with breathing, sleep apnea and reduced coughing, which is especially dangerous for patients with lung disease. Overdose or dangerous interactions with alcohol and other medications may occur, leading to death. Hyperalgesia may develop, which means patients receiving opioids for the treatment of pain may become more sensitive to certain painful stimuli, and in some cases, experience pain from ordinarily non-painful stimuli. Women between the ages of 14-53 who could become pregnant should carefully weigh the risks and benefits of opioids with their physicians, as these medications increase the risk of pregnancy complications, including miscarriage,  delivery and stillbirth. It is also possible for babies to be born addicted to opioids. Opioid dependence withdrawal symptoms may include; feelings of uneasiness, increased pain, irritability, belly pain, diarrhea, sweats and goose-flesh. Testosterone replacement therapy:  Potential side effects include increased risk of stroke and heart attack, blood clots, increased blood pressure, increased cholesterol, enlarged prostate, sleep apnea, irritability/aggression and other mood disorders, and decreased fertility. Obed Galan (1985)             Page 1 of 4    Initials:_______    Benzodiazepines and non-benzodiazepine sleep medications: These medications can lead to problems such as addiction/dependence, sedation, fatigue, lightheadedness, dizziness, incoordination, falls, depression, hallucinations, and impaired judgment, memory and concentration. The ability to drive and operate machinery may also be affected. Abnormal sleep-related behaviors have been reported, including sleepwalking, driving, making telephone calls, eating, or having sex while not fully awake. These medications can suppress breathing and worsen sleep apnea, particularly when combined with alcohol or other sedating medications, potentially leading to death.  Dependence withdrawal symptoms may include tremors, anxiety, hallucinations and seizures. Stimulants:  Common adverse effects include addiction/dependence, increased blood pressure and heart rate, decreased appetite, nausea, involuntary weight loss, insomnia,  irritability, and headaches. These risks may increase when these medications are combined with other stimulants, such as caffeine pills or energy drinks, certain weight loss supplements and oral decongestants. Dependence withdrawal symptoms may include depressed mood, loss of interest, suicidal thoughts, anxiety, fatigue, appetite changes and agitation. I agree and understand that I and my prescriber have the following rights and responsibilities regarding my treatment plan:   1. MY RIGHTS:  To be informed of my treatment and medication plan. To be an active participant in my health and wellbeing. 2. MY RESPONSIBILITY AND UNDERSTANDING FOR USE OF MEDICATIONS   I will take medications at the dose and frequency as directed. For my safety, I will not increase or change how I take my medications without the recommendation of my healthcare provider.  I will actively participate in any program recommended by my provider which may improve function, including social, physical, psychological programs.  I will not take my medications with alcohol or other drugs not prescribed to me. I understand that drinking alcohol with my medications increases the chances of side effects, including reduced breathing rate and could lead to personal injury when operating machinery.  I understand that if I have a history of substance use disorders, including alcohol or other illicit drugs, that I may be at increased risk of addiction to my medications.  I agree to notify my provider immediately if I should become pregnant so that my treatment plan can be adjusted.    I agree and understand that I shall only receive controlled substance medications from the prescriber that signed this agreement unless there is written agreement among other prescribers of controlled substances outlining the responsibility of the medications being prescribed.  I understand that the if the controlled medication is not helping to achieve goals, the dosage may be tapered and no longer prescribed. 3. MY RESPONSIBILITY FOR COMMUNICATION / PRESCRIPTION RENEWALS   I agree that all controlled substance medications that I take will be prescribed only by my provider. If another healthcare provider prescribes me medication in an emergency, I will notify my provider within seventy-two (72) hours. Obed Galan (1985)             Page 2 of 4    Initials:_______   I will arrange for refills at the prescribed interval ONLY during regular office hours. I will not ask for refills earlier than agreed, after-hours, on holidays or weekends. Refills may take up to 72 hours for processing and prescriptions to reach the pharmacy.  I will inform my other health care providers that I am taking these medications and of the existence of this [id number]. In the event of an emergency, I will provide the same information to the emergency department prescribers.  I will keep my provider updated on the pharmacy I am using for controlled medication prescription filling. 4. MY RESPONSIBILITY FOR PROTECTING MEDICATIONS   I will protect my prescriptions and medications. I understand that lost or misplaced prescriptions will not be replaced.  I will keep medications only for my own use and will not share them with others. I will keep all medications away from children.  I agree that if my medications are adjusted or discontinued, I will properly dispose of any remaining medications. I understand that I will be required to dispose of any remaining controlled medications as, directed by my prescriber, prior to being provided with any prescriptions for other controlled medications.   Medication drop box locations can be found at: [email or web address]  5. MY RESPONSIBILITY WITH ILLEGAL DRUGS    I will not use illegal or street drugs or another person's prescription medications not prescribed to me.  If there are identified addiction type symptoms, then referral to a program may be provided by my provider and I agree to follow through with this recommendation. 6. MY RESPONSIBILITY FOR COOPERATION WITH INVESTIGATIONS   I understand that my provider will comply with any applicable law and may discuss my use and/or possible misuse/abuse of controlled substances and alcohol, as appropriate, with any health care provider involved in my care, pharmacist, or legal authority.  I authorize my provider and pharmacy to cooperate fully with law enforcement agencies (as permitted by law) in the investigation of any possible misuse, sale, or other diversion of my controlled substances.  I agree to waive any applicable privilege or right of privacy or confidentiality with respect to these authorizations. 7. PROVIDERS RIGHT TO MONITOR FOR SAFETY: PRESCRIPTION MONITORING / DRUG TESTING   I consent to drug/toxicology screening and will submit to a drug screen upon my providers request to assure I am only taking the prescribed drugs for my safety monitoring. I understand that a drug screen is a laboratory test in which a sample of my urine, blood or saliva is checked to see what drugs I have been taking. This may entail an observed urine specimen, which means that a nurse or other health care provider may watch me provide urine, and I will cooperate if I am asked to provide an observed specimen. Obed Galan (1985)             Page 3 of 4    Initials:_______  Audrey I understand that my provider will check a copy of my State Prescription Monitoring Program () Report in order to safely prescribe medications.      Pill Counts: I consent to pill counts when requested. I may be asked to bring all my prescribed controlled substance medications, in their original bottles, to all of my scheduled appointments. In addition, my provider may ask me to come to the practice at any time for a random pill count. 8. TERMINATION OF THIS AGREEMENT   For my safety, my prescriber has the right to stop prescribing controlled substance medications and may end this agreement.  Conditions that may result in termination of this agreement:  a. I do not show any improvement in pain, or my activity has not improved. b. I develop rapid tolerance or loss of improvement, as described in my treatment plan.  c. I develop significant side effects from the medication. d. My behavior is not consistent with the responsibilities outlined above, thereby causing safety concerns to continue prescribing controlled substance medications. e. I fail to follow the terms of this agreement. f. Other:____________________________     UNDERSTANDING THIS MEDICATION AGREEMENT:    I have read the above and have had all my questions answered. For chronic disease management, I know that my symptoms can be managed with many types of treatments. A chronic medication trial may be part of my treatment, but I must be an active participant in my care. Medication therapy is only one part of my symptom management plan. In some cases, there may be limited scientific evidence to support the chronic use of certain medications to improve symptoms and daily function. Furthermore, in certain circumstances, there may be scientific information that suggests that the use of chronic controlled substances may worsen my symptoms and increase my risk of unintentional death directly related to this medication therapy. I know that if my provider feels my risk from controlled medications is greater than my benefit, I will have my controlled substance medication(s) compassionately lowered or removed altogether. I further agree to allow this office to contact my HIPAA contact if there are concerns about my safety and use of the controlled medications. I have agreed to use the prescribed controlled substance medications to me as instructed by my provider and as stated in this Medication Agreement. My initial on each page and my signature below shows that I have read each page and I have had the opportunity to ask questions with answers provided by my provider.       Patient Name (Printed): _____________________________________    Patient Signature:  ______________________   Date: _____________      Prescriber Name (Printed): ___________________________________    Prescriber Signature: _____________________  Date: _____________     Zeynep Galan (1985)             Page 4 of 4

## 2022-10-31 RX ORDER — GABAPENTIN 300 MG/1
CAPSULE ORAL
Qty: 30 CAPSULE | Refills: 0 | Status: SHIPPED | OUTPATIENT
Start: 2022-10-31

## 2022-10-31 RX ORDER — DICLOFENAC SODIUM 75 MG/1
75 TABLET, DELAYED RELEASE ORAL 2 TIMES DAILY WITH MEALS
Qty: 60 TABLET | Refills: 0 | Status: SHIPPED | OUTPATIENT
Start: 2022-10-31

## 2022-11-01 NOTE — PROGRESS NOTES
Obed Galan (: 1985) is a 44 y.o. female, patient, here for evaluation of the following chief complaint(s):  Leg Pain and LOW BACK PAIN       ASSESSMENT/PLAN:  Below is the assessment and plan developed based on review of pertinent history, physical exam, labs, studies, and medications. Patient presents today for evaluation of chronic bilateral low back pain. Symptoms of been ongoing for 10 years, progressively worsening over the last 5. She has not had formal treatment. She does report pain radiating down the posterior aspect of bilateral lower extremities. No weakness noted on physical exam.  Radiologic findings reviewed in detail with the patient. At this point, I would like for her to begin with conservative management. Referral to physical therapy provided today. Gabapentin and diclofenac also prescribed.  checked. Discussed with the patient that she cannot take over-the-counter anti-inflammatories while taking diclofenac. Patient verbalized understanding. She will follow-up in about 8 weeks, at which time we may get a lumbar spine MRI if her symptoms have not significantly improved. 1. DDD (degenerative disc disease), lumbar  -     XR SPINE LUMB MIN 4 V; Future  -     REFERRAL TO PHYSICAL THERAPY  2. Chronic bilateral low back pain with bilateral sciatica  -     REFERRAL TO PHYSICAL THERAPY      No follow-ups on file. SUBJECTIVE/OBJECTIVE:  Obed Galan (: 1985) is a 44 y.o. female. Pain Assessment  10/27/2022   Location Modifiers Posterior   Severity of Pain 7   Aggravating Factors Standing;Walking;Stairs        Patient presents today for evaluation of chronic bilateral low back pain, right greater than left for the last 10 years. She states this has been progressively worsening for the last 5 years. She has intermittent radiation of pain down the posterior aspect of bilateral lower extremities. She currently takes Tylenol as needed for her symptoms.   Her symptoms are worse with walking, bending and laying flat. She has not found something to significantly relieve her symptoms. She has not had significant treatment for this issue recently. She does report some subjective lower extremity weakness. Denies any acute changes in bowel or bladder control. Imaging:    XR Results (most recent):  Results from Appointment encounter on 10/27/22    XR SPINE LUMB MIN 4 V    Narrative  AP and lateral and flexion-extension lumbar spine demonstrates maintained lumbar lordosis. Mild retrolisthesis L4-5 with extension. No fractures or lytic lesions. No subluxations. MRI Results (most recent):  No results found for this or any previous visit. Allergies   Allergen Reactions    Latex Rash    Lanolin Hives    Lamictal [Lamotrigine] Rash    Tegretol [Carbamazepine] Rash       Current Outpatient Medications   Medication Sig    diazePAM (VALIUM) 10 mg tablet Take 10 mg by mouth two (2) times a day. dextroamphetamine-amphetamine (ADDERALL) 30 mg tablet Take 1 Tab by mouth three (3) times daily. pilocarpine (SALAGEN) 5 mg tablet Take 1 Tab by mouth three (3) times daily. multivitamin (ONE A DAY) tablet Take 1 Tab by mouth daily. betamethasone valerate (VALISONE) 0.1 % ointment Apply  to affected area two (2) times a day. diclofenac EC (VOLTAREN) 75 mg EC tablet Take 1 Tablet by mouth two (2) times daily (with meals). gabapentin (Neurontin) 300 mg capsule 1-2 tabs at night    diazePAM (VALIUM) 2 mg tablet Take 1 Tab by mouth every twelve (12) hours as needed. Max Daily Amount: 4 mg.    norgestimate-ethinyl estradiol (ORTHO-CYCLEN, SPRINTEC) 0.25-35 mg-mcg tab Take 1 Tab by mouth daily. No current facility-administered medications for this visit. Past Medical History:   Diagnosis Date    ADHD (attention deficit hyperactivity disorder) 4/14/2010    Hyperthyroidism 4/14/2010        History reviewed. No pertinent surgical history.     Family History   Problem Relation Age of Onset    Diabetes Father     Hypertension Mother     Osteoporosis Paternal Grandmother     Diabetes Paternal Grandmother         Social History     Tobacco Use    Smoking status: Some Days     Packs/day: 0.05     Years: 18.00     Pack years: 0.90     Types: Cigarettes     Last attempt to quit: 2013     Years since quittin.7    Smokeless tobacco: Current   Vaping Use    Vaping Use: Every day   Substance Use Topics    Alcohol use: Not Currently     Comment: Occasionally    Drug use: Yes     Types: Marijuana        Review of Systems   Constitutional:  Negative for chills and fever. Gastrointestinal:  Negative for nausea and vomiting. Musculoskeletal:  Positive for arthralgias and back pain. Neurological:  Positive for weakness. Negative for numbness. All other systems reviewed and are negative. Vitals:  Ht 5' 3\" (1.6 m)   Wt 137 lb (62.1 kg)   BMI 24.27 kg/m²    Body mass index is 24.27 kg/m². Physical Exam  Neurologic  Sensory  Light Touch - Intact - Globally. Overall Assessment of Muscle Strength and Tone reveals  Lower Extremities - Right Iliopsoas - 5/5. Left Iliopsoas - 5/5. Right Tibialis Anterior - 5/5. Left Tibialis Anterior - 5/5. Right Gastroc-Soleus - 5/5. Left Gastroc-Soleus - 5/5. Right EHL - 5/5. Left EHL - 5/5. General Assessment of Reflexes  Right Ankle - Clonus is not present. Left Ankle - Clonus is not present. Reflexes (Dermatomes)  2/2 Normal - Left Achilles (L5-S2), Left Knee (L2-4), Right Achilles (L5-S2) and Right Knee (L2-4). Musculoskeletal  Global Assessment  Examination of related systems reveals - well-developed, well-nourished, in no acute distress, alert and oriented x 3. Gait and Station - normal gait and station and normal posture. Right Lower Extremity - normal strength and tone, normal range of motion without pain and no instability, subluxation or laxity.  Left Lower Extremity - normal strength and tone, normal range of motion without pain and no instability, subluxation or laxity. Spine/Ribs/Pelvis  Cervical Spine - Examination of the cervical spine reveals - no tenderness to palpation, no pain, no swelling, edema or erythema, normal cervical spine movements and normal sensation. Thoracic (Dorsal) Spine - Examination of the thoracic spine reveals - no tenderness over thoracic vertebrae, no pain, normal sensation and normal thoracic spine movements. Lumbosacral Spine - Examination of the lumbosacral spine reveals - no known fractures or deformities. Inspection and Palpation - Tenderness - moderate. Assessment of pain reveals the following findings - The pain is characterized as - moderate. Location - pain refers to lower back bilaterally. ROJM - Trunk Extension - 15 degrees. Lumbar Spine Flexion - 35 °. Lumbosacral Spine - Functional Testing - Babinski Test negative, Prone Knee Bending Test negative, Slump Test negative, Straight Leg Raising Test negative. Dr. Navjot Amezquita was available for immediate consult during this encounter. An electronic signature was used to authenticate this note.   -- PATRICK Sousa

## 2023-08-07 ENCOUNTER — HOSPITAL ENCOUNTER (OUTPATIENT)
Facility: HOSPITAL | Age: 38
Discharge: HOME | End: 2023-08-10
Attending: ORTHOPAEDIC SURGERY
Payer: COMMERCIAL

## 2023-08-07 DIAGNOSIS — G89.29 CHRONIC BILATERAL LOW BACK PAIN WITH LEFT-SIDED SCIATICA: ICD-10-CM

## 2023-08-07 DIAGNOSIS — M51.36 OTHER INTERVERTEBRAL DISC DEGENERATION, LUMBAR REGION: ICD-10-CM

## 2023-08-07 DIAGNOSIS — M54.42 CHRONIC BILATERAL LOW BACK PAIN WITH LEFT-SIDED SCIATICA: ICD-10-CM

## 2023-08-07 PROCEDURE — 72148 MRI LUMBAR SPINE W/O DYE: CPT

## 2023-10-30 SDOH — ECONOMIC STABILITY: FOOD INSECURITY: WITHIN THE PAST 12 MONTHS, THE FOOD YOU BOUGHT JUST DIDN'T LAST AND YOU DIDN'T HAVE MONEY TO GET MORE.: NEVER TRUE

## 2023-10-30 SDOH — ECONOMIC STABILITY: HOUSING INSECURITY
IN THE LAST 12 MONTHS, WAS THERE A TIME WHEN YOU DID NOT HAVE A STEADY PLACE TO SLEEP OR SLEPT IN A SHELTER (INCLUDING NOW)?: NO

## 2023-10-30 SDOH — ECONOMIC STABILITY: TRANSPORTATION INSECURITY
IN THE PAST 12 MONTHS, HAS LACK OF TRANSPORTATION KEPT YOU FROM MEETINGS, WORK, OR FROM GETTING THINGS NEEDED FOR DAILY LIVING?: NO

## 2023-10-30 SDOH — ECONOMIC STABILITY: INCOME INSECURITY: HOW HARD IS IT FOR YOU TO PAY FOR THE VERY BASICS LIKE FOOD, HOUSING, MEDICAL CARE, AND HEATING?: NOT VERY HARD

## 2023-10-30 SDOH — ECONOMIC STABILITY: FOOD INSECURITY: WITHIN THE PAST 12 MONTHS, YOU WORRIED THAT YOUR FOOD WOULD RUN OUT BEFORE YOU GOT MONEY TO BUY MORE.: NEVER TRUE

## 2023-10-31 ENCOUNTER — OFFICE VISIT (OUTPATIENT)
Facility: CLINIC | Age: 38
End: 2023-10-31
Payer: COMMERCIAL

## 2023-10-31 VITALS
SYSTOLIC BLOOD PRESSURE: 130 MMHG | WEIGHT: 152 LBS | RESPIRATION RATE: 16 BRPM | OXYGEN SATURATION: 98 % | TEMPERATURE: 98.4 F | BODY MASS INDEX: 26.93 KG/M2 | HEART RATE: 101 BPM | HEIGHT: 63 IN | DIASTOLIC BLOOD PRESSURE: 85 MMHG

## 2023-10-31 DIAGNOSIS — F90.0 ATTENTION DEFICIT HYPERACTIVITY DISORDER (ADHD), PREDOMINANTLY INATTENTIVE TYPE: ICD-10-CM

## 2023-10-31 DIAGNOSIS — Z13.1 SCREENING FOR DIABETES MELLITUS (DM): ICD-10-CM

## 2023-10-31 DIAGNOSIS — F41.9 ANXIETY: Primary | ICD-10-CM

## 2023-10-31 DIAGNOSIS — E05.90 HYPERTHYROIDISM: ICD-10-CM

## 2023-10-31 DIAGNOSIS — R68.2 DRY MOUTH: ICD-10-CM

## 2023-10-31 DIAGNOSIS — F31.81 BIPOLAR 2 DISORDER (HCC): ICD-10-CM

## 2023-10-31 PROCEDURE — 99214 OFFICE O/P EST MOD 30 MIN: CPT | Performed by: FAMILY MEDICINE

## 2023-10-31 RX ORDER — PILOCARPINE HYDROCHLORIDE 5 MG/1
5 TABLET, FILM COATED ORAL 3 TIMES DAILY
Qty: 90 TABLET | Refills: 5 | Status: SHIPPED | OUTPATIENT
Start: 2023-10-31

## 2023-10-31 RX ORDER — CITALOPRAM HYDROBROMIDE 10 MG/1
10 TABLET ORAL DAILY
Qty: 30 TABLET | Refills: 5 | Status: SHIPPED | OUTPATIENT
Start: 2023-10-31

## 2023-10-31 RX ORDER — DIAZEPAM 2 MG/1
2 TABLET ORAL EVERY 8 HOURS PRN
Qty: 30 TABLET | Refills: 0 | Status: SHIPPED | OUTPATIENT
Start: 2023-10-31 | End: 2023-11-20

## 2023-10-31 ASSESSMENT — PATIENT HEALTH QUESTIONNAIRE - PHQ9
SUM OF ALL RESPONSES TO PHQ QUESTIONS 1-9: 2
SUM OF ALL RESPONSES TO PHQ9 QUESTIONS 1 & 2: 2
2. FEELING DOWN, DEPRESSED OR HOPELESS: 1
1. LITTLE INTEREST OR PLEASURE IN DOING THINGS: 1
SUM OF ALL RESPONSES TO PHQ QUESTIONS 1-9: 2

## 2023-10-31 NOTE — PATIENT INSTRUCTIONS
Exercise has been shown to be as effective as medication in the treatment of anxiety. If you do not exercise on a regular basis I recommend starting an exercise program. Would start with a walking program of 10 to 20 minutes of a rapid walk 3 to 4 times a week. Goal is to achieve a moderate intesity 30 minute walk, 5 days a week. Afterwards you should feel tired but not exhausted. You can increase the pace as tolerated. Other exercise maybe substituted for walking such as bicycling and gentle weight lifting.

## 2023-11-10 ENCOUNTER — TELEPHONE (OUTPATIENT)
Facility: CLINIC | Age: 38
End: 2023-11-10

## 2023-11-13 ENCOUNTER — TELEPHONE (OUTPATIENT)
Facility: CLINIC | Age: 38
End: 2023-11-13

## 2023-11-13 DIAGNOSIS — R41.840 ATTENTION DEFICIT: Primary | ICD-10-CM

## 2023-11-13 NOTE — TELEPHONE ENCOUNTER
Left message that she needs to see therapist or psychologist to make a diagnosis before I will prescribe.

## 2023-12-05 ENCOUNTER — OFFICE VISIT (OUTPATIENT)
Facility: CLINIC | Age: 38
End: 2023-12-05
Payer: COMMERCIAL

## 2023-12-05 VITALS
OXYGEN SATURATION: 96 % | TEMPERATURE: 98.4 F | WEIGHT: 156 LBS | RESPIRATION RATE: 16 BRPM | HEIGHT: 63 IN | BODY MASS INDEX: 27.64 KG/M2 | HEART RATE: 106 BPM | SYSTOLIC BLOOD PRESSURE: 113 MMHG | DIASTOLIC BLOOD PRESSURE: 71 MMHG

## 2023-12-05 DIAGNOSIS — F31.81 BIPOLAR 2 DISORDER (HCC): ICD-10-CM

## 2023-12-05 DIAGNOSIS — F90.0 ATTENTION DEFICIT HYPERACTIVITY DISORDER (ADHD), PREDOMINANTLY INATTENTIVE TYPE: Primary | ICD-10-CM

## 2023-12-05 DIAGNOSIS — F90.0 ATTENTION DEFICIT HYPERACTIVITY DISORDER (ADHD), PREDOMINANTLY INATTENTIVE TYPE: ICD-10-CM

## 2023-12-05 PROCEDURE — 99214 OFFICE O/P EST MOD 30 MIN: CPT | Performed by: FAMILY MEDICINE

## 2023-12-05 RX ORDER — DEXTROAMPHETAMINE SACCHARATE, AMPHETAMINE ASPARTATE, DEXTROAMPHETAMINE SULFATE AND AMPHETAMINE SULFATE 7.5; 7.5; 7.5; 7.5 MG/1; MG/1; MG/1; MG/1
1 TABLET ORAL DAILY
Qty: 30 TABLET | Refills: 0 | Status: SHIPPED | OUTPATIENT
Start: 2023-12-05 | End: 2024-01-04

## 2023-12-05 ASSESSMENT — PATIENT HEALTH QUESTIONNAIRE - PHQ9
SUM OF ALL RESPONSES TO PHQ QUESTIONS 1-9: 2
SUM OF ALL RESPONSES TO PHQ9 QUESTIONS 1 & 2: 2
SUM OF ALL RESPONSES TO PHQ QUESTIONS 1-9: 2
2. FEELING DOWN, DEPRESSED OR HOPELESS: 1
SUM OF ALL RESPONSES TO PHQ QUESTIONS 1-9: 2
1. LITTLE INTEREST OR PLEASURE IN DOING THINGS: 1
SUM OF ALL RESPONSES TO PHQ QUESTIONS 1-9: 2

## 2023-12-06 LAB
AMPHET UR QL SCN: POSITIVE
BARBITURATES UR QL SCN: NEGATIVE
BENZODIAZ UR QL: POSITIVE
CANNABINOIDS UR QL SCN: POSITIVE
COCAINE UR QL SCN: NEGATIVE
Lab: ABNORMAL
METHADONE UR QL: NEGATIVE
OPIATES UR QL: NEGATIVE
PCP UR QL: NEGATIVE

## 2024-01-10 ENCOUNTER — OFFICE VISIT (OUTPATIENT)
Facility: CLINIC | Age: 39
End: 2024-01-10

## 2024-01-10 VITALS
OXYGEN SATURATION: 96 % | WEIGHT: 163 LBS | BODY MASS INDEX: 28.88 KG/M2 | DIASTOLIC BLOOD PRESSURE: 72 MMHG | SYSTOLIC BLOOD PRESSURE: 108 MMHG | TEMPERATURE: 98.5 F | RESPIRATION RATE: 16 BRPM | HEART RATE: 94 BPM | HEIGHT: 63 IN

## 2024-01-10 DIAGNOSIS — R30.0 DYSURIA: Primary | ICD-10-CM

## 2024-01-10 DIAGNOSIS — F90.0 ATTENTION DEFICIT HYPERACTIVITY DISORDER (ADHD), PREDOMINANTLY INATTENTIVE TYPE: ICD-10-CM

## 2024-01-10 DIAGNOSIS — F41.9 ANXIETY: ICD-10-CM

## 2024-01-10 DIAGNOSIS — N30.00 ACUTE CYSTITIS WITHOUT HEMATURIA: Primary | ICD-10-CM

## 2024-01-10 LAB
BILIRUBIN, URINE, POC: NEGATIVE
BLOOD URINE, POC: NEGATIVE
GLUCOSE URINE, POC: NEGATIVE
KETONES, URINE, POC: NEGATIVE
LEUKOCYTE ESTERASE, URINE, POC: NORMAL
NITRITE, URINE, POC: NORMAL
PH, URINE, POC: 6 (ref 4.6–8)
PROTEIN,URINE, POC: NEGATIVE
SPECIFIC GRAVITY, URINE, POC: 1.03 (ref 1–1.03)
URINALYSIS CLARITY, POC: CLEAR
URINALYSIS COLOR, POC: YELLOW
UROBILINOGEN, POC: NORMAL

## 2024-01-10 RX ORDER — NITROFURANTOIN 25; 75 MG/1; MG/1
100 CAPSULE ORAL 2 TIMES DAILY
Qty: 14 CAPSULE | Refills: 0 | Status: SHIPPED | OUTPATIENT
Start: 2024-01-10 | End: 2024-01-17

## 2024-01-10 RX ORDER — DEXTROAMPHETAMINE SACCHARATE, AMPHETAMINE ASPARTATE, DEXTROAMPHETAMINE SULFATE AND AMPHETAMINE SULFATE 7.5; 7.5; 7.5; 7.5 MG/1; MG/1; MG/1; MG/1
1 TABLET ORAL DAILY
Qty: 30 TABLET | Refills: 0 | Status: SHIPPED | OUTPATIENT
Start: 2024-01-10 | End: 2024-02-09

## 2024-01-10 RX ORDER — DEXTROAMPHETAMINE SACCHARATE, AMPHETAMINE ASPARTATE, DEXTROAMPHETAMINE SULFATE AND AMPHETAMINE SULFATE 7.5; 7.5; 7.5; 7.5 MG/1; MG/1; MG/1; MG/1
1 TABLET ORAL DAILY
Qty: 30 TABLET | Refills: 0 | Status: SHIPPED | OUTPATIENT
Start: 2024-01-10 | End: 2024-01-10 | Stop reason: SDUPTHER

## 2024-01-10 RX ORDER — FOLIC ACID 1 MG/1
1 TABLET ORAL DAILY
COMMUNITY

## 2024-01-10 RX ORDER — MOMETASONE FUROATE 50 UG/1
2 SPRAY, METERED NASAL DAILY
COMMUNITY

## 2024-01-10 SDOH — ECONOMIC STABILITY: FOOD INSECURITY: WITHIN THE PAST 12 MONTHS, YOU WORRIED THAT YOUR FOOD WOULD RUN OUT BEFORE YOU GOT MONEY TO BUY MORE.: NEVER TRUE

## 2024-01-10 SDOH — ECONOMIC STABILITY: FOOD INSECURITY: WITHIN THE PAST 12 MONTHS, THE FOOD YOU BOUGHT JUST DIDN'T LAST AND YOU DIDN'T HAVE MONEY TO GET MORE.: NEVER TRUE

## 2024-01-10 SDOH — ECONOMIC STABILITY: INCOME INSECURITY: HOW HARD IS IT FOR YOU TO PAY FOR THE VERY BASICS LIKE FOOD, HOUSING, MEDICAL CARE, AND HEATING?: NOT HARD AT ALL

## 2024-01-10 ASSESSMENT — PATIENT HEALTH QUESTIONNAIRE - PHQ9
SUM OF ALL RESPONSES TO PHQ9 QUESTIONS 1 & 2: 2
2. FEELING DOWN, DEPRESSED OR HOPELESS: 1
SUM OF ALL RESPONSES TO PHQ QUESTIONS 1-9: 2
1. LITTLE INTEREST OR PLEASURE IN DOING THINGS: 1

## 2024-01-10 ASSESSMENT — ANXIETY QUESTIONNAIRES
2. NOT BEING ABLE TO STOP OR CONTROL WORRYING: 1
1. FEELING NERVOUS, ANXIOUS, OR ON EDGE: 1

## 2024-01-10 NOTE — PROGRESS NOTES
Chief Complaint   Patient presents with    Urinary Pain     Urinary pain, frequently      Patient has not been out of the country in (14 months), NO diarrhea, NO cough, NO chest conjestion, NO temp.  Pt has not been around anyone with these symptoms.     Health Maintenance reviewed.    I have reviewed the patient's medical history in detail and updated the computerized patient record.    \"Have you been to the ER, urgent care clinic since your last visit? No  Hospitalized since your last visit?\"    no    “Have you seen or consulted any other health care providers outside of [facility] since your last visit?”    no        “Have you had a pap smear?”    no                                      
amphetamine-dextroamphetamine (ADDERALL) 30 MG tablet    DISCONTINUED: amphetamine-dextroamphetamine (ADDERALL) 30 MG tablet      3. Anxiety          Orders Placed This Encounter    AMB POC URINALYSIS DIP STICK AUTO W/O MICRO    mometasone (NASONEX 24HR) 50 MCG/ACT nasal spray     Si sprays by Each Nostril route daily    folic acid (FOLVITE) 1 MG tablet     Sig: Take 1 tablet by mouth daily    DISCONTD: amphetamine-dextroamphetamine (ADDERALL) 30 MG tablet     Sig: Take 1 tablet by mouth daily for 30 days. Max Daily Amount: 1 tablet     Dispense:  30 tablet     Refill:  0    nitrofurantoin, macrocrystal-monohydrate, (MACROBID) 100 MG capsule     Sig: Take 1 capsule by mouth 2 times daily for 7 days     Dispense:  14 capsule     Refill:  0    amphetamine-dextroamphetamine (ADDERALL) 30 MG tablet     Sig: Take 1 tablet by mouth daily for 30 days. Max Daily Amount: 1 tablet     Dispense:  30 tablet     Refill:  0     Drug screen shows marijuana I discouraged her taking this.  Amphetamine is present.    Current Outpatient Medications   Medication Sig Dispense Refill    mometasone (NASONEX 24HR) 50 MCG/ACT nasal spray 2 sprays by Each Nostril route daily      folic acid (FOLVITE) 1 MG tablet Take 1 tablet by mouth daily      nitrofurantoin, macrocrystal-monohydrate, (MACROBID) 100 MG capsule Take 1 capsule by mouth 2 times daily for 7 days 14 capsule 0    amphetamine-dextroamphetamine (ADDERALL) 30 MG tablet Take 1 tablet by mouth daily for 30 days. Max Daily Amount: 1 tablet 30 tablet 0    citalopram (CELEXA) 10 MG tablet Take 1 tablet by mouth daily 30 tablet 5    pilocarpine (SALAGEN) 5 MG tablet Take 1 tablet by mouth 3 times daily 90 tablet 5    gabapentin (NEURONTIN) 300 MG capsule Take 1 to 2 capsules by mouth nightly. 60 capsule 1    betamethasone valerate (VALISONE) 0.1 % ointment Apply topically 2 times daily       No current facility-administered medications for this visit.     Discussed possible side

## 2024-01-11 LAB
AMPHET UR QL SCN: POSITIVE
BARBITURATES UR QL SCN: NEGATIVE
BENZODIAZ UR QL: NEGATIVE
CANNABINOIDS UR QL SCN: POSITIVE
COCAINE UR QL SCN: NEGATIVE
Lab: ABNORMAL
METHADONE UR QL: NEGATIVE
OPIATES UR QL: NEGATIVE
PCP UR QL: NEGATIVE

## 2024-01-17 ENCOUNTER — TELEPHONE (OUTPATIENT)
Age: 39
End: 2024-01-17

## 2024-01-17 NOTE — TELEPHONE ENCOUNTER
(Key: [id number])  PA Case ID #: [id number]  Rx #: [id number] Approved Drug: Amphetamine-Dextroamphetamine  Strength: 30 mg  Quantity/Days: 30/30  Duration: 12 months  01/17/2024 - 01/17/2025  [facility] called Mariel stated,medication was last filled 1/10/24,noted new approval

## 2024-01-18 DIAGNOSIS — F90.0 ATTENTION DEFICIT HYPERACTIVITY DISORDER (ADHD), PREDOMINANTLY INATTENTIVE TYPE: ICD-10-CM

## 2024-01-19 RX ORDER — DEXTROAMPHETAMINE SACCHARATE, AMPHETAMINE ASPARTATE, DEXTROAMPHETAMINE SULFATE AND AMPHETAMINE SULFATE 7.5; 7.5; 7.5; 7.5 MG/1; MG/1; MG/1; MG/1
1 TABLET ORAL DAILY
Qty: 30 TABLET | Refills: 0 | Status: CANCELLED | OUTPATIENT
Start: 2024-01-19 | End: 2024-02-18

## 2024-01-30 DIAGNOSIS — N30.00 ACUTE CYSTITIS WITHOUT HEMATURIA: Primary | ICD-10-CM

## 2024-01-30 RX ORDER — NITROFURANTOIN 25; 75 MG/1; MG/1
100 CAPSULE ORAL 2 TIMES DAILY
Qty: 14 CAPSULE | Refills: 0 | Status: SHIPPED | OUTPATIENT
Start: 2024-01-30 | End: 2024-02-06

## 2024-02-07 DIAGNOSIS — R68.2 DRY MOUTH: ICD-10-CM

## 2024-02-07 DIAGNOSIS — F90.0 ATTENTION DEFICIT HYPERACTIVITY DISORDER (ADHD), PREDOMINANTLY INATTENTIVE TYPE: ICD-10-CM

## 2024-02-07 RX ORDER — DEXTROAMPHETAMINE SACCHARATE, AMPHETAMINE ASPARTATE, DEXTROAMPHETAMINE SULFATE AND AMPHETAMINE SULFATE 7.5; 7.5; 7.5; 7.5 MG/1; MG/1; MG/1; MG/1
1 TABLET ORAL DAILY
Qty: 35 TABLET | Refills: 0 | Status: SHIPPED | OUTPATIENT
Start: 2024-02-07 | End: 2024-03-13

## 2024-02-07 RX ORDER — PILOCARPINE HYDROCHLORIDE 5 MG/1
5 TABLET, FILM COATED ORAL 3 TIMES DAILY
Qty: 90 TABLET | Refills: 5 | Status: SHIPPED | OUTPATIENT
Start: 2024-02-07

## 2024-02-26 NOTE — PROGRESS NOTES
Subjective:     Brian Grullon is a 38 y.o. female who presents for follow up of   Patient Active Problem List   Diagnosis    Bipolar 2 disorder (HCC)    ADHD (attention deficit hyperactivity disorder)    Hyperthyroidism       New concerns: f/up, feels well, sciatica not too bad  Adderall working OK      Psychiatric ROS:   Reports taking psychiatric medications without side affects. No complaints of abnormal movements, suicidal ideation or focal weakness.     Review of Systems, additional:  Pertinent items are noted in HPI.      Patient Active Problem List   Diagnosis    Bipolar 2 disorder (HCC)    ADHD (attention deficit hyperactivity disorder)       Current Outpatient Medications   Medication Sig Dispense Refill    pilocarpine (SALAGEN) 5 MG tablet Take 1 tablet by mouth 3 times daily 90 tablet 5    amphetamine-dextroamphetamine (ADDERALL) 30 MG tablet Take 1 tablet by mouth daily for 35 days. Max Daily Amount: 1 tablet 35 tablet 0    mometasone (NASONEX 24HR) 50 MCG/ACT nasal spray 2 sprays by Each Nostril route daily      folic acid (FOLVITE) 1 MG tablet Take 1 tablet by mouth daily      citalopram (CELEXA) 10 MG tablet Take 1 tablet by mouth daily 30 tablet 5    betamethasone valerate (VALISONE) 0.1 % ointment Apply topically 2 times daily      gabapentin (NEURONTIN) 300 MG capsule Take 1 to 2 capsules by mouth nightly. 60 capsule 1     No current facility-administered medications for this visit.     Allergies   Allergen Reactions    Latex Rash    Lanolin Hives    Carbamazepine Rash    Lamotrigine Rash     Past Medical History:   Diagnosis Date    ADHD (attention deficit hyperactivity disorder) 2010    Hyperthyroidism 2010     Social History     Tobacco Use    Smoking status: Some Days     Current packs/day: 0.00     Types: Cigarettes     Last attempt to quit: 2013     Years since quittin.0    Smokeless tobacco: Current   Substance Use Topics    Alcohol use: Not Currently

## 2024-02-28 ENCOUNTER — OFFICE VISIT (OUTPATIENT)
Facility: CLINIC | Age: 39
End: 2024-02-28
Payer: COMMERCIAL

## 2024-02-28 VITALS
OXYGEN SATURATION: 98 % | WEIGHT: 159 LBS | SYSTOLIC BLOOD PRESSURE: 122 MMHG | HEART RATE: 98 BPM | HEIGHT: 63 IN | RESPIRATION RATE: 16 BRPM | BODY MASS INDEX: 28.17 KG/M2 | TEMPERATURE: 98 F | DIASTOLIC BLOOD PRESSURE: 79 MMHG

## 2024-02-28 DIAGNOSIS — F41.9 ANXIETY: ICD-10-CM

## 2024-02-28 DIAGNOSIS — R68.2 DRY MOUTH: ICD-10-CM

## 2024-02-28 DIAGNOSIS — F31.81 BIPOLAR 2 DISORDER (HCC): Primary | ICD-10-CM

## 2024-02-28 DIAGNOSIS — E05.90 HYPERTHYROIDISM: ICD-10-CM

## 2024-02-28 DIAGNOSIS — J30.1 SEASONAL ALLERGIC RHINITIS DUE TO POLLEN: ICD-10-CM

## 2024-02-28 DIAGNOSIS — F90.0 ATTENTION DEFICIT HYPERACTIVITY DISORDER (ADHD), PREDOMINANTLY INATTENTIVE TYPE: ICD-10-CM

## 2024-02-28 PROCEDURE — G2211 COMPLEX E/M VISIT ADD ON: HCPCS | Performed by: FAMILY MEDICINE

## 2024-02-28 PROCEDURE — 99214 OFFICE O/P EST MOD 30 MIN: CPT | Performed by: FAMILY MEDICINE

## 2024-02-28 RX ORDER — PILOCARPINE HYDROCHLORIDE 5 MG/1
5 TABLET, FILM COATED ORAL 3 TIMES DAILY
Qty: 270 TABLET | Refills: 3 | Status: SHIPPED | OUTPATIENT
Start: 2024-02-28

## 2024-02-28 RX ORDER — FOLIC ACID 1 MG/1
1 TABLET ORAL DAILY
Qty: 90 TABLET | Refills: 3 | Status: SHIPPED | OUTPATIENT
Start: 2024-02-28

## 2024-02-28 RX ORDER — MOMETASONE FUROATE 50 UG/1
2 SPRAY, METERED NASAL DAILY
Qty: 1 EACH | Refills: 5 | Status: SHIPPED | OUTPATIENT
Start: 2024-02-28 | End: 2024-03-01 | Stop reason: CLARIF

## 2024-02-28 RX ORDER — CITALOPRAM HYDROBROMIDE 10 MG/1
10 TABLET ORAL DAILY
Qty: 90 TABLET | Refills: 3 | Status: SHIPPED | OUTPATIENT
Start: 2024-02-28

## 2024-02-28 RX ORDER — DEXTROAMPHETAMINE SACCHARATE, AMPHETAMINE ASPARTATE, DEXTROAMPHETAMINE SULFATE AND AMPHETAMINE SULFATE 7.5; 7.5; 7.5; 7.5 MG/1; MG/1; MG/1; MG/1
1 TABLET ORAL DAILY
Qty: 35 TABLET | Refills: 0 | Status: SHIPPED | OUTPATIENT
Start: 2024-02-28 | End: 2024-04-03

## 2024-02-28 ASSESSMENT — PATIENT HEALTH QUESTIONNAIRE - PHQ9
2. FEELING DOWN, DEPRESSED OR HOPELESS: 0
SUM OF ALL RESPONSES TO PHQ QUESTIONS 1-9: 0
SUM OF ALL RESPONSES TO PHQ QUESTIONS 1-9: 0
SUM OF ALL RESPONSES TO PHQ9 QUESTIONS 1 & 2: 0
1. LITTLE INTEREST OR PLEASURE IN DOING THINGS: 0
SUM OF ALL RESPONSES TO PHQ QUESTIONS 1-9: 0
SUM OF ALL RESPONSES TO PHQ QUESTIONS 1-9: 0

## 2024-02-28 NOTE — PROGRESS NOTES
Chief Complaint   Patient presents with    Medication Refill     Patient has not been out of the country in (14 months), NO diarrhea, NO cough, NO chest conjestion, NO temp.  Pt has not been around anyone with these symptoms.     Health Maintenance reviewed.    I have reviewed the patient's medical history in detail and updated the computerized patient record.    \"Have you been to the ER, urgent care clinic since your last visit?  No Hospitalized since your last visit?\"    No     “Have you seen or consulted any other health care providers outside of [facility] since your last visit?”    no        “Have you had a pap smear?”    no

## 2024-02-29 ENCOUNTER — TELEPHONE (OUTPATIENT)
Age: 39
End: 2024-02-29

## 2024-02-29 NOTE — TELEPHONE ENCOUNTER
PA request for Mometasone nasal spray has been denied. Additional information will be provided in the denial communication. (Message 1140) Your PA request has been denied. Additional information will be provided in the denial communication. (Message 1140) Case ID: [id number]      Payer:  [facility] - Managed Medicaid   Electronic appeal:  Not supported

## 2024-03-01 RX ORDER — FLUTICASONE PROPIONATE 50 MCG
2 SPRAY, SUSPENSION (ML) NASAL DAILY
Qty: 16 G | Refills: 2 | Status: SHIPPED | OUTPATIENT
Start: 2024-03-01

## 2024-04-05 DIAGNOSIS — F90.0 ATTENTION DEFICIT HYPERACTIVITY DISORDER (ADHD), PREDOMINANTLY INATTENTIVE TYPE: ICD-10-CM

## 2024-04-05 RX ORDER — DEXTROAMPHETAMINE SACCHARATE, AMPHETAMINE ASPARTATE, DEXTROAMPHETAMINE SULFATE AND AMPHETAMINE SULFATE 7.5; 7.5; 7.5; 7.5 MG/1; MG/1; MG/1; MG/1
1 TABLET ORAL DAILY
Qty: 35 TABLET | Refills: 0 | Status: SHIPPED | OUTPATIENT
Start: 2024-04-05 | End: 2024-05-10

## 2024-05-30 ENCOUNTER — OFFICE VISIT (OUTPATIENT)
Facility: CLINIC | Age: 39
End: 2024-05-30
Payer: COMMERCIAL

## 2024-05-30 VITALS
OXYGEN SATURATION: 98 % | TEMPERATURE: 98.2 F | WEIGHT: 163 LBS | HEART RATE: 76 BPM | HEIGHT: 63 IN | SYSTOLIC BLOOD PRESSURE: 117 MMHG | BODY MASS INDEX: 28.88 KG/M2 | RESPIRATION RATE: 16 BRPM | DIASTOLIC BLOOD PRESSURE: 77 MMHG

## 2024-05-30 DIAGNOSIS — F90.0 ATTENTION DEFICIT HYPERACTIVITY DISORDER (ADHD), PREDOMINANTLY INATTENTIVE TYPE: ICD-10-CM

## 2024-05-30 DIAGNOSIS — L24.7 CONTACT DERMATITIS AND ECZEMA DUE TO PLANT: Primary | ICD-10-CM

## 2024-05-30 DIAGNOSIS — F31.81 BIPOLAR 2 DISORDER (HCC): ICD-10-CM

## 2024-05-30 PROCEDURE — 99214 OFFICE O/P EST MOD 30 MIN: CPT | Performed by: FAMILY MEDICINE

## 2024-05-30 RX ORDER — BETAMETHASONE DIPROPIONATE 0.05 %
OINTMENT (GRAM) TOPICAL DAILY
Qty: 50 G | Refills: 1 | Status: SHIPPED | OUTPATIENT
Start: 2024-05-30

## 2024-05-30 RX ORDER — DEXTROAMPHETAMINE SACCHARATE, AMPHETAMINE ASPARTATE, DEXTROAMPHETAMINE SULFATE AND AMPHETAMINE SULFATE 7.5; 7.5; 7.5; 7.5 MG/1; MG/1; MG/1; MG/1
1 TABLET ORAL DAILY
Qty: 35 TABLET | Refills: 0 | Status: SHIPPED | OUTPATIENT
Start: 2024-05-30 | End: 2024-07-04

## 2024-05-30 ASSESSMENT — PATIENT HEALTH QUESTIONNAIRE - PHQ9
7. TROUBLE CONCENTRATING ON THINGS, SUCH AS READING THE NEWSPAPER OR WATCHING TELEVISION: NOT AT ALL
9. THOUGHTS THAT YOU WOULD BE BETTER OFF DEAD, OR OF HURTING YOURSELF: NOT AT ALL
SUM OF ALL RESPONSES TO PHQ QUESTIONS 1-9: 2
SUM OF ALL RESPONSES TO PHQ QUESTIONS 1-9: 2
2. FEELING DOWN, DEPRESSED OR HOPELESS: SEVERAL DAYS
SUM OF ALL RESPONSES TO PHQ QUESTIONS 1-9: 2
SUM OF ALL RESPONSES TO PHQ9 QUESTIONS 1 & 2: 2
4. FEELING TIRED OR HAVING LITTLE ENERGY: NOT AT ALL
SUM OF ALL RESPONSES TO PHQ QUESTIONS 1-9: 2
SUM OF ALL RESPONSES TO PHQ9 QUESTIONS 1 & 2: 2
1. LITTLE INTEREST OR PLEASURE IN DOING THINGS: SEVERAL DAYS
SUM OF ALL RESPONSES TO PHQ QUESTIONS 1-9: 2
SUM OF ALL RESPONSES TO PHQ QUESTIONS 1-9: 2
2. FEELING DOWN, DEPRESSED OR HOPELESS: SEVERAL DAYS
SUM OF ALL RESPONSES TO PHQ QUESTIONS 1-9: 2
1. LITTLE INTEREST OR PLEASURE IN DOING THINGS: SEVERAL DAYS
SUM OF ALL RESPONSES TO PHQ QUESTIONS 1-9: 2
8. MOVING OR SPEAKING SO SLOWLY THAT OTHER PEOPLE COULD HAVE NOTICED. OR THE OPPOSITE, BEING SO FIGETY OR RESTLESS THAT YOU HAVE BEEN MOVING AROUND A LOT MORE THAN USUAL: NOT AT ALL
6. FEELING BAD ABOUT YOURSELF - OR THAT YOU ARE A FAILURE OR HAVE LET YOURSELF OR YOUR FAMILY DOWN: NOT AT ALL
3. TROUBLE FALLING OR STAYING ASLEEP: NOT AT ALL
10. IF YOU CHECKED OFF ANY PROBLEMS, HOW DIFFICULT HAVE THESE PROBLEMS MADE IT FOR YOU TO DO YOUR WORK, TAKE CARE OF THINGS AT HOME, OR GET ALONG WITH OTHER PEOPLE: NOT DIFFICULT AT ALL
5. POOR APPETITE OR OVEREATING: NOT AT ALL

## 2024-05-30 NOTE — PROGRESS NOTES
Chief Complaint   Patient presents with    Rash     Rash to arms, truck, breast and hips     Patient has not been out of the country in (14 months), NO diarrhea, NO cough, NO chest conjestion, NO temp.  Pt has not been around anyone with these symptoms.     Health Maintenance reviewed.    I have reviewed the patient's medical history in detail and updated the computerized patient record.    \"Have you been to the ER, urgent care clinic since your last visit?  No Hospitalized since your last visit?\"    no    “Have you seen or consulted any other health care providers outside of [facility] since your last visit?”    no        “Have you had a pap smear?”    no    Date of last Cervical Cancer screen (HPV or PAP): 6/13/2018

## 2024-05-30 NOTE — PROGRESS NOTES
Assessment/Plan:        Assessment & Plan  1. Contact dermatitis.  The condition is likely a result of plant exposure. A prescription for betamethasone topical dipropionate 50 g has been issued. The patient has been advised to apply the cream to the affected areas, followed by the application of Saran wrap. Should there be no improvement in a few days, the patient has been instructed to contact me, at which point a prescription for additional medication will be provided.  Discussed possible side affects, precautions, and drug interactions and possible benefits of the medication(s).    2. Attention deficit hyperactivity disorder.  A refill of the patient's Adderall prescription has been provided.    Follow-up  A follow-up appointment is scheduled for the patient in a few months.    Results        ICD-10-CM    1. Contact dermatitis and eczema due to plant  L24.7 betamethasone dipropionate 0.05 % ointment      2. Bipolar 2 disorder (HCC)  F31.81       3. Attention deficit hyperactivity disorder (ADHD), predominantly inattentive type  F90.0 amphetamine-dextroamphetamine (ADDERALL) 30 MG tablet        Orders Placed This Encounter    amphetamine-dextroamphetamine (ADDERALL) 30 MG tablet     Sig: Take 1 tablet by mouth daily for 35 days. Max Daily Amount: 1 tablet     Dispense:  35 tablet     Refill:  0    betamethasone dipropionate 0.05 % ointment     Sig: Apply topically daily     Dispense:  50 g     Refill:  1       Current Outpatient Medications   Medication Sig Dispense Refill    amphetamine-dextroamphetamine (ADDERALL) 30 MG tablet Take 1 tablet by mouth daily for 35 days. Max Daily Amount: 1 tablet 35 tablet 0    betamethasone dipropionate 0.05 % ointment Apply topically daily 50 g 1    fluticasone (FLONASE) 50 MCG/ACT nasal spray 2 sprays by Each Nostril route daily 16 g 2    citalopram (CELEXA) 10 MG tablet Take 1 tablet by mouth daily 90 tablet 3    pilocarpine (SALAGEN) 5 MG tablet Take 1 tablet by mouth 3

## 2024-06-17 RX ORDER — FLUTICASONE PROPIONATE 50 MCG
SPRAY, SUSPENSION (ML) NASAL
Qty: 16 G | Refills: 1 | OUTPATIENT
Start: 2024-06-17

## 2024-06-18 RX ORDER — FLUTICASONE PROPIONATE 50 MCG
2 SPRAY, SUSPENSION (ML) NASAL DAILY
Qty: 16 G | Refills: 2 | Status: SHIPPED | OUTPATIENT
Start: 2024-06-18

## 2024-06-30 DIAGNOSIS — F90.0 ATTENTION DEFICIT HYPERACTIVITY DISORDER (ADHD), PREDOMINANTLY INATTENTIVE TYPE: ICD-10-CM

## 2024-07-01 RX ORDER — DEXTROAMPHETAMINE SACCHARATE, AMPHETAMINE ASPARTATE, DEXTROAMPHETAMINE SULFATE AND AMPHETAMINE SULFATE 7.5; 7.5; 7.5; 7.5 MG/1; MG/1; MG/1; MG/1
1 TABLET ORAL DAILY
Qty: 35 TABLET | Refills: 0 | OUTPATIENT
Start: 2024-07-01 | End: 2024-08-05

## 2024-07-02 ENCOUNTER — TELEMEDICINE (OUTPATIENT)
Facility: CLINIC | Age: 39
End: 2024-07-02
Payer: COMMERCIAL

## 2024-07-02 DIAGNOSIS — Z63.6 CAREGIVER BURDEN: ICD-10-CM

## 2024-07-02 DIAGNOSIS — F90.0 ATTENTION DEFICIT HYPERACTIVITY DISORDER (ADHD), PREDOMINANTLY INATTENTIVE TYPE: Primary | ICD-10-CM

## 2024-07-02 PROCEDURE — 99213 OFFICE O/P EST LOW 20 MIN: CPT | Performed by: FAMILY MEDICINE

## 2024-07-02 RX ORDER — DEXTROAMPHETAMINE SACCHARATE, AMPHETAMINE ASPARTATE, DEXTROAMPHETAMINE SULFATE AND AMPHETAMINE SULFATE 7.5; 7.5; 7.5; 7.5 MG/1; MG/1; MG/1; MG/1
1 TABLET ORAL DAILY
Qty: 35 TABLET | Refills: 0 | Status: SHIPPED | OUTPATIENT
Start: 2024-07-02 | End: 2024-08-06

## 2024-07-02 SDOH — SOCIAL STABILITY - SOCIAL INSECURITY: DEPENDENT RELATIVE NEEDING CARE AT HOME: Z63.6

## 2024-07-02 ASSESSMENT — PATIENT HEALTH QUESTIONNAIRE - PHQ9
SUM OF ALL RESPONSES TO PHQ9 QUESTIONS 1 & 2: 2
2. FEELING DOWN, DEPRESSED OR HOPELESS: SEVERAL DAYS
SUM OF ALL RESPONSES TO PHQ QUESTIONS 1-9: 2
1. LITTLE INTEREST OR PLEASURE IN DOING THINGS: SEVERAL DAYS
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
## 2024-08-05 DIAGNOSIS — F90.0 ATTENTION DEFICIT HYPERACTIVITY DISORDER (ADHD), PREDOMINANTLY INATTENTIVE TYPE: ICD-10-CM

## 2024-08-05 RX ORDER — DEXTROAMPHETAMINE SACCHARATE, AMPHETAMINE ASPARTATE, DEXTROAMPHETAMINE SULFATE AND AMPHETAMINE SULFATE 7.5; 7.5; 7.5; 7.5 MG/1; MG/1; MG/1; MG/1
1 TABLET ORAL DAILY
Qty: 35 TABLET | Refills: 0 | Status: SHIPPED | OUTPATIENT
Start: 2024-08-05 | End: 2024-09-09

## 2024-09-16 ENCOUNTER — OFFICE VISIT (OUTPATIENT)
Facility: CLINIC | Age: 39
End: 2024-09-16
Payer: COMMERCIAL

## 2024-09-16 VITALS
BODY MASS INDEX: 29.23 KG/M2 | OXYGEN SATURATION: 99 % | TEMPERATURE: 98.1 F | DIASTOLIC BLOOD PRESSURE: 81 MMHG | WEIGHT: 165 LBS | HEART RATE: 88 BPM | RESPIRATION RATE: 16 BRPM | HEIGHT: 63 IN | SYSTOLIC BLOOD PRESSURE: 119 MMHG

## 2024-09-16 DIAGNOSIS — F90.0 ATTENTION DEFICIT HYPERACTIVITY DISORDER (ADHD), PREDOMINANTLY INATTENTIVE TYPE: ICD-10-CM

## 2024-09-16 DIAGNOSIS — F31.81 BIPOLAR 2 DISORDER (HCC): Primary | ICD-10-CM

## 2024-09-16 PROCEDURE — 99213 OFFICE O/P EST LOW 20 MIN: CPT | Performed by: FAMILY MEDICINE

## 2024-09-16 RX ORDER — DEXTROAMPHETAMINE SACCHARATE, AMPHETAMINE ASPARTATE, DEXTROAMPHETAMINE SULFATE AND AMPHETAMINE SULFATE 7.5; 7.5; 7.5; 7.5 MG/1; MG/1; MG/1; MG/1
1 TABLET ORAL DAILY
Qty: 35 TABLET | Refills: 0 | Status: SHIPPED | OUTPATIENT
Start: 2024-09-16 | End: 2024-10-21

## 2024-09-16 RX ORDER — FLUTICASONE PROPIONATE 50 MCG
SPRAY, SUSPENSION (ML) NASAL
Qty: 16 G | Refills: 11 | Status: SHIPPED | OUTPATIENT
Start: 2024-09-16

## 2024-09-16 ASSESSMENT — PATIENT HEALTH QUESTIONNAIRE - PHQ9
SUM OF ALL RESPONSES TO PHQ QUESTIONS 1-9: 2
2. FEELING DOWN, DEPRESSED OR HOPELESS: SEVERAL DAYS
SUM OF ALL RESPONSES TO PHQ QUESTIONS 1-9: 2
SUM OF ALL RESPONSES TO PHQ QUESTIONS 1-9: 2
10. IF YOU CHECKED OFF ANY PROBLEMS, HOW DIFFICULT HAVE THESE PROBLEMS MADE IT FOR YOU TO DO YOUR WORK, TAKE CARE OF THINGS AT HOME, OR GET ALONG WITH OTHER PEOPLE: NOT DIFFICULT AT ALL
1. LITTLE INTEREST OR PLEASURE IN DOING THINGS: SEVERAL DAYS
SUM OF ALL RESPONSES TO PHQ9 QUESTIONS 1 & 2: 2
SUM OF ALL RESPONSES TO PHQ QUESTIONS 1-9: 2

## 2024-10-14 DIAGNOSIS — F90.0 ATTENTION DEFICIT HYPERACTIVITY DISORDER (ADHD), PREDOMINANTLY INATTENTIVE TYPE: ICD-10-CM

## 2024-10-15 RX ORDER — DEXTROAMPHETAMINE SACCHARATE, AMPHETAMINE ASPARTATE, DEXTROAMPHETAMINE SULFATE AND AMPHETAMINE SULFATE 7.5; 7.5; 7.5; 7.5 MG/1; MG/1; MG/1; MG/1
1 TABLET ORAL DAILY
Qty: 35 TABLET | Refills: 0 | Status: SHIPPED | OUTPATIENT
Start: 2024-10-15 | End: 2024-11-19

## 2024-11-18 DIAGNOSIS — F90.0 ATTENTION DEFICIT HYPERACTIVITY DISORDER (ADHD), PREDOMINANTLY INATTENTIVE TYPE: ICD-10-CM

## 2024-11-19 RX ORDER — DEXTROAMPHETAMINE SACCHARATE, AMPHETAMINE ASPARTATE, DEXTROAMPHETAMINE SULFATE AND AMPHETAMINE SULFATE 7.5; 7.5; 7.5; 7.5 MG/1; MG/1; MG/1; MG/1
1 TABLET ORAL DAILY
Qty: 35 TABLET | Refills: 0 | OUTPATIENT
Start: 2024-11-19 | End: 2024-12-24

## 2024-11-19 NOTE — TELEPHONE ENCOUNTER
LM that an OV or VV needs to be made with Dr. Webb and we have openings this Fri VV 22nd if needed to refill her RX

## 2024-11-22 ENCOUNTER — TELEMEDICINE (OUTPATIENT)
Facility: CLINIC | Age: 39
End: 2024-11-22

## 2024-11-22 DIAGNOSIS — F90.0 ATTENTION DEFICIT HYPERACTIVITY DISORDER (ADHD), PREDOMINANTLY INATTENTIVE TYPE: ICD-10-CM

## 2024-11-22 DIAGNOSIS — Z13.1 DIABETES MELLITUS SCREENING: ICD-10-CM

## 2024-11-22 DIAGNOSIS — N92.1 METRORRHAGIA: ICD-10-CM

## 2024-11-22 DIAGNOSIS — Z13.220 SCREENING CHOLESTEROL LEVEL: ICD-10-CM

## 2024-11-22 DIAGNOSIS — L24.7 CONTACT DERMATITIS AND ECZEMA DUE TO PLANT: ICD-10-CM

## 2024-11-22 DIAGNOSIS — L30.9 ECZEMA, UNSPECIFIED TYPE: Primary | ICD-10-CM

## 2024-11-22 RX ORDER — BETAMETHASONE DIPROPIONATE 0.05 %
OINTMENT (GRAM) TOPICAL DAILY
Qty: 50 G | Refills: 1 | Status: SHIPPED | OUTPATIENT
Start: 2024-11-22

## 2024-11-22 RX ORDER — DEXTROAMPHETAMINE SACCHARATE, AMPHETAMINE ASPARTATE, DEXTROAMPHETAMINE SULFATE AND AMPHETAMINE SULFATE 7.5; 7.5; 7.5; 7.5 MG/1; MG/1; MG/1; MG/1
1 TABLET ORAL DAILY
Qty: 35 TABLET | Refills: 0 | Status: SHIPPED | OUTPATIENT
Start: 2024-11-22 | End: 2024-12-27

## 2024-11-22 ASSESSMENT — PATIENT HEALTH QUESTIONNAIRE - PHQ9
10. IF YOU CHECKED OFF ANY PROBLEMS, HOW DIFFICULT HAVE THESE PROBLEMS MADE IT FOR YOU TO DO YOUR WORK, TAKE CARE OF THINGS AT HOME, OR GET ALONG WITH OTHER PEOPLE: NOT DIFFICULT AT ALL
SUM OF ALL RESPONSES TO PHQ QUESTIONS 1-9: 2
1. LITTLE INTEREST OR PLEASURE IN DOING THINGS: SEVERAL DAYS
SUM OF ALL RESPONSES TO PHQ QUESTIONS 1-9: 2
SUM OF ALL RESPONSES TO PHQ QUESTIONS 1-9: 2
SUM OF ALL RESPONSES TO PHQ9 QUESTIONS 1 & 2: 2
2. FEELING DOWN, DEPRESSED OR HOPELESS: SEVERAL DAYS
SUM OF ALL RESPONSES TO PHQ QUESTIONS 1-9: 2

## 2024-11-22 NOTE — PROGRESS NOTES
Chief Complaint   Patient presents with    Medication Refill     Patient has not been out of the country in (14 months), NO diarrhea, NO cough, NO chest conjestion, NO temp.  Pt has not been around anyone with these symptoms.     Health Maintenance reviewed.    I have reviewed the patient's medical history in detail and updated the computerized patient record.    \"Have you been to the ER, urgent care clinic since your last visit? No  Hospitalized since your last visit?\"    no    “Have you seen or consulted any other health care providers outside of [facility] since your last visit?”    no       Have you had a mammogram?”   no    No breast cancer screening on file      “Have you had a pap smear?”    no    Date of last Cervical Cancer screen (HPV or PAP): 6/13/2018                                       
dipropionate 0.05 % ointment; Apply topically daily, Topical, DAILY Starting Fri 11/22/2024, Disp-50 g, R-1, Normal  2. Attention deficit hyperactivity disorder (ADHD), predominantly inattentive type  -     amphetamine-dextroamphetamine (ADDERALL) 30 MG tablet; Take 1 tablet by mouth daily for 35 days. Max Daily Amount: 1 tablet, Disp-35 tablet, R-0Normal  3. Contact dermatitis and eczema due to plant  4. Diabetes mellitus screening  -     Basic Metabolic Panel; Future  5. Metrorrhagia  -     CBC; Future  6. Screening cholesterol level  -     Lipid Panel; Future       ICD-10-CM    1. Eczema, unspecified type  L30.9 betamethasone dipropionate 0.05 % ointment      2. Attention deficit hyperactivity disorder (ADHD), predominantly inattentive type  F90.0 amphetamine-dextroamphetamine (ADDERALL) 30 MG tablet      3. Contact dermatitis and eczema due to plant  L24.7       4. Diabetes mellitus screening  Z13.1 Basic Metabolic Panel      5. Metrorrhagia  N92.1 CBC      6. Screening cholesterol level  Z13.220 Lipid Panel          Current Outpatient Medications   Medication Sig Dispense Refill    amphetamine-dextroamphetamine (ADDERALL) 30 MG tablet Take 1 tablet by mouth daily for 35 days. Max Daily Amount: 1 tablet 35 tablet 0    betamethasone dipropionate 0.05 % ointment Apply topically daily 50 g 1    fluticasone (FLONASE) 50 MCG/ACT nasal spray INHALE 2 SPRAYS BY EACH NOSTRIL ROUTE DAILY TO PREVENT CONGESTION AND ALLERGIES 16 g 11    citalopram (CELEXA) 10 MG tablet Take 1 tablet by mouth daily 90 tablet 3    pilocarpine (SALAGEN) 5 MG tablet Take 1 tablet by mouth 3 times daily 270 tablet 3    folic acid (FOLVITE) 1 MG tablet Take 1 tablet by mouth daily 90 tablet 3    betamethasone valerate (VALISONE) 0.1 % ointment Apply topically 2 times daily      gabapentin (NEURONTIN) 300 MG capsule Take 1 capsule by mouth nightly for 30 days. Supervising Physician: Ernesto Ledbetter MD NPI: [id number] TIERRA: [id number] Max Daily

## 2024-12-16 DIAGNOSIS — F90.0 ATTENTION DEFICIT HYPERACTIVITY DISORDER (ADHD), PREDOMINANTLY INATTENTIVE TYPE: ICD-10-CM

## 2024-12-16 RX ORDER — DEXTROAMPHETAMINE SACCHARATE, AMPHETAMINE ASPARTATE, DEXTROAMPHETAMINE SULFATE AND AMPHETAMINE SULFATE 7.5; 7.5; 7.5; 7.5 MG/1; MG/1; MG/1; MG/1
1 TABLET ORAL DAILY
Qty: 35 TABLET | Refills: 0 | Status: SHIPPED | OUTPATIENT
Start: 2024-12-16 | End: 2025-01-20

## 2025-01-09 NOTE — PROGRESS NOTES
Brian Grullon, was evaluated through a synchronous (real-time) audio-video encounter. The patient (or guardian if applicable) is aware that this is a billable service, which includes applicable co-pays. This Virtual Visit was conducted with patient's (and/or legal guardian's) consent. Patient identification was verified, and a caregiver was present when appropriate.   The patient was located at Home: [address] [place] [id number]  Provider was located at Other: [place]  Confirm you are appropriately licensed, registered, or certified to deliver care in the state where the patient is located as indicated above. If you are not or unsure, please re-schedule the visit: Yes, I confirm.     Brian Grullon (:  1985) is a Established patient, presenting virtually for evaluation of the following: discuss WL    Assessment & Plan   Below is the assessment and plan developed based on review of pertinent history, physical exam, labs, studies, and medications.  Assessment & Plan  1. Attention deficit hyperactivity disorder (ADHD).  A prescription for Adderall, to be taken once daily, has been refilled for the upcoming month. She is advised to contact the office via phone or MyChart when her supply is running low to facilitate a timely refill.    2. Weight management.  The potential use of GLP-1 agonists for weight loss was discussed, including the challenges with insurance coverage if there is no diabetes diagnosis. Alternative programs for obtaining the medication at a lower cost will be explored during the next visit. A Basic Metabolic Panel (BMP) has been ordered to assess her blood glucose levels, given her family history of diabetes. The results will guide the discussion on the use of GLP-1 agonists for weight loss. The BMP will be faxed to [facility] for completion today.    3. Medication management.  She is currently taking citalopram every morning, which she reports is working well. She also uses gabapentin

## 2025-01-10 ENCOUNTER — TELEMEDICINE (OUTPATIENT)
Facility: CLINIC | Age: 40
End: 2025-01-10
Payer: COMMERCIAL

## 2025-01-10 DIAGNOSIS — F90.0 ATTENTION DEFICIT HYPERACTIVITY DISORDER (ADHD), PREDOMINANTLY INATTENTIVE TYPE: ICD-10-CM

## 2025-01-10 DIAGNOSIS — F31.81 BIPOLAR 2 DISORDER (HCC): Primary | ICD-10-CM

## 2025-01-10 PROCEDURE — 99213 OFFICE O/P EST LOW 20 MIN: CPT | Performed by: FAMILY MEDICINE

## 2025-01-10 RX ORDER — DEXTROAMPHETAMINE SACCHARATE, AMPHETAMINE ASPARTATE, DEXTROAMPHETAMINE SULFATE AND AMPHETAMINE SULFATE 7.5; 7.5; 7.5; 7.5 MG/1; MG/1; MG/1; MG/1
1 TABLET ORAL DAILY
Qty: 35 TABLET | Refills: 0 | Status: SHIPPED | OUTPATIENT
Start: 2025-01-10 | End: 2025-02-14

## 2025-01-11 LAB
BASOPHILS # BLD AUTO: 0.1 X10E3/UL (ref 0–0.2)
BASOPHILS NFR BLD AUTO: 1 %
BUN SERPL-MCNC: 11 MG/DL (ref 6–24)
BUN/CREAT SERPL: 20 (ref 9–23)
CALCIUM SERPL-MCNC: 8.9 MG/DL (ref 8.7–10.2)
CHLORIDE SERPL-SCNC: 104 MMOL/L (ref 96–106)
CHOLEST SERPL-MCNC: 198 MG/DL (ref 100–199)
CO2 SERPL-SCNC: 20 MMOL/L (ref 20–29)
CREAT SERPL-MCNC: 0.55 MG/DL (ref 0.57–1)
EGFRCR SERPLBLD CKD-EPI 2021: 119 ML/MIN/1.73
EOSINOPHIL # BLD AUTO: 0.3 X10E3/UL (ref 0–0.4)
EOSINOPHIL NFR BLD AUTO: 3 %
ERYTHROCYTE [DISTWIDTH] IN BLOOD BY AUTOMATED COUNT: 12.6 % (ref 11.7–15.4)
GLUCOSE SERPL-MCNC: 86 MG/DL (ref 70–99)
HCT VFR BLD AUTO: 39.7 % (ref 34–46.6)
HDLC SERPL-MCNC: 40 MG/DL
HGB BLD-MCNC: 13.7 G/DL (ref 11.1–15.9)
IMM GRANULOCYTES # BLD AUTO: 0 X10E3/UL (ref 0–0.1)
IMM GRANULOCYTES NFR BLD AUTO: 0 %
IMP & REVIEW OF LAB RESULTS: NORMAL
LDLC SERPL CALC-MCNC: 135 MG/DL (ref 0–99)
LYMPHOCYTES # BLD AUTO: 4 X10E3/UL (ref 0.7–3.1)
LYMPHOCYTES NFR BLD AUTO: 40 %
MCH RBC QN AUTO: 33.7 PG (ref 26.6–33)
MCHC RBC AUTO-ENTMCNC: 34.5 G/DL (ref 31.5–35.7)
MCV RBC AUTO: 98 FL (ref 79–97)
MONOCYTES # BLD AUTO: 0.7 X10E3/UL (ref 0.1–0.9)
MONOCYTES NFR BLD AUTO: 7 %
NEUTROPHILS # BLD AUTO: 4.9 X10E3/UL (ref 1.4–7)
NEUTROPHILS NFR BLD AUTO: 49 %
PLATELET # BLD AUTO: 398 X10E3/UL (ref 150–450)
POTASSIUM SERPL-SCNC: 4.1 MMOL/L (ref 3.5–5.2)
RBC # BLD AUTO: 4.06 X10E6/UL (ref 3.77–5.28)
SODIUM SERPL-SCNC: 138 MMOL/L (ref 134–144)
SPECIMEN STATUS REPORT: NORMAL
TRIGL SERPL-MCNC: 129 MG/DL (ref 0–149)
VLDLC SERPL CALC-MCNC: 23 MG/DL (ref 5–40)
WBC # BLD AUTO: 9.9 X10E3/UL (ref 3.4–10.8)

## 2025-01-13 ENCOUNTER — OFFICE VISIT (OUTPATIENT)
Facility: CLINIC | Age: 40
End: 2025-01-13
Payer: COMMERCIAL

## 2025-01-13 VITALS
HEIGHT: 63 IN | WEIGHT: 181 LBS | TEMPERATURE: 98 F | DIASTOLIC BLOOD PRESSURE: 82 MMHG | SYSTOLIC BLOOD PRESSURE: 119 MMHG | OXYGEN SATURATION: 96 % | HEART RATE: 85 BPM | BODY MASS INDEX: 32.07 KG/M2 | RESPIRATION RATE: 22 BRPM

## 2025-01-13 DIAGNOSIS — F31.81 BIPOLAR 2 DISORDER (HCC): Primary | ICD-10-CM

## 2025-01-13 DIAGNOSIS — F90.0 ATTENTION DEFICIT HYPERACTIVITY DISORDER (ADHD), PREDOMINANTLY INATTENTIVE TYPE: ICD-10-CM

## 2025-01-13 PROCEDURE — 99214 OFFICE O/P EST MOD 30 MIN: CPT | Performed by: FAMILY MEDICINE

## 2025-01-13 RX ORDER — TIRZEPATIDE 2.5 MG/.5ML
2.5 INJECTION, SOLUTION SUBCUTANEOUS WEEKLY
Qty: 2 ML | Refills: 2 | Status: SHIPPED | OUTPATIENT
Start: 2025-01-13

## 2025-01-13 SDOH — ECONOMIC STABILITY: INCOME INSECURITY: IN THE LAST 12 MONTHS, WAS THERE A TIME WHEN YOU WERE NOT ABLE TO PAY THE MORTGAGE OR RENT ON TIME?: NO

## 2025-01-13 SDOH — ECONOMIC STABILITY: FOOD INSECURITY: WITHIN THE PAST 12 MONTHS, YOU WORRIED THAT YOUR FOOD WOULD RUN OUT BEFORE YOU GOT MONEY TO BUY MORE.: NEVER TRUE

## 2025-01-13 SDOH — ECONOMIC STABILITY: FOOD INSECURITY: WITHIN THE PAST 12 MONTHS, THE FOOD YOU BOUGHT JUST DIDN'T LAST AND YOU DIDN'T HAVE MONEY TO GET MORE.: NEVER TRUE

## 2025-01-13 SDOH — ECONOMIC STABILITY: TRANSPORTATION INSECURITY
IN THE PAST 12 MONTHS, HAS THE LACK OF TRANSPORTATION KEPT YOU FROM MEDICAL APPOINTMENTS OR FROM GETTING MEDICATIONS?: NO

## 2025-01-13 ASSESSMENT — PATIENT HEALTH QUESTIONNAIRE - PHQ9
4. FEELING TIRED OR HAVING LITTLE ENERGY: NOT AT ALL
7. TROUBLE CONCENTRATING ON THINGS, SUCH AS READING THE NEWSPAPER OR WATCHING TELEVISION: SEVERAL DAYS
10. IF YOU CHECKED OFF ANY PROBLEMS, HOW DIFFICULT HAVE THESE PROBLEMS MADE IT FOR YOU TO DO YOUR WORK, TAKE CARE OF THINGS AT HOME, OR GET ALONG WITH OTHER PEOPLE: SOMEWHAT DIFFICULT
5. POOR APPETITE OR OVEREATING: NOT AT ALL
3. TROUBLE FALLING OR STAYING ASLEEP: NOT AT ALL
SUM OF ALL RESPONSES TO PHQ QUESTIONS 1-9: 3
SUM OF ALL RESPONSES TO PHQ9 QUESTIONS 1 & 2: 2
1. LITTLE INTEREST OR PLEASURE IN DOING THINGS: SEVERAL DAYS
SUM OF ALL RESPONSES TO PHQ QUESTIONS 1-9: 3
SUM OF ALL RESPONSES TO PHQ QUESTIONS 1-9: 3
2. FEELING DOWN, DEPRESSED OR HOPELESS: SEVERAL DAYS
6. FEELING BAD ABOUT YOURSELF - OR THAT YOU ARE A FAILURE OR HAVE LET YOURSELF OR YOUR FAMILY DOWN: NOT AT ALL
9. THOUGHTS THAT YOU WOULD BE BETTER OFF DEAD, OR OF HURTING YOURSELF: NOT AT ALL
8. MOVING OR SPEAKING SO SLOWLY THAT OTHER PEOPLE COULD HAVE NOTICED. OR THE OPPOSITE, BEING SO FIGETY OR RESTLESS THAT YOU HAVE BEEN MOVING AROUND A LOT MORE THAN USUAL: NOT AT ALL
SUM OF ALL RESPONSES TO PHQ QUESTIONS 1-9: 3

## 2025-01-13 NOTE — PROGRESS NOTES
\"Have you been to the ER, urgent care clinic since your last visit?  Hospitalized since your last visit?\"    NO    “Have you seen or consulted any other health care providers outside of [facility] since your last visit?”    NO       Have you had a mammogram?”   NO    No breast cancer screening on file      “Have you had a pap smear?”    YES - Where: [facility], June 2023 Nurse/CMA to request most recent records if not in the chart    Date of last Cervical Cancer screen (HPV or PAP): 6/13/2018   Chief Complaint   Patient presents with    Diabetes     ? Discuss, GLP-1  And discuss labs       Vitals:    01/13/25 1055   BP: 119/82   Pulse: 85   Resp: 22   Temp: 98 °F (36.7 °C)   SpO2: 96%       
predominantly inattentive type    Caregiver burden       History of Present Illness  The patient presents for evaluation of weight gain.    She has been experiencing recent weight gain, which she attributes to her fondness for carbohydrates. She has not been adhering to a strict diet regimen. She expresses a desire to engage in running activities but is currently unable to do so. She has insurance through [employer].    She has not yet picked up her Adderall prescription, which was filled on Friday.    MEDICATIONS  Adderall    Social History     Social History Narrative    Single, no children, States smokes marijauna 5-7 cigarettes per week, unemployed    Lives with BF     No past surgical history on file.  Family History   Problem Relation Age of Onset    Osteoporosis Paternal Grandmother     Diabetes Paternal Grandmother     Hypertension Mother     Diabetes Father      Social History     Socioeconomic History    Marital status: Single     Spouse name: Not on file    Number of children: Not on file    Years of education: Not on file    Highest education level: Not on file   Occupational History    Not on file   Tobacco Use    Smoking status: Some Days     Current packs/day: 0.00     Types: Cigarettes     Last attempt to quit: 2013     Years since quittin.9    Smokeless tobacco: Current   Substance and Sexual Activity    Alcohol use: Not Currently    Drug use: Yes     Types: Marijuana (Weed)    Sexual activity: Not on file   Other Topics Concern    Not on file   Social History Narrative    Single, no children, States smokes marijauna 5-7 cigarettes per week, unemployed    Lives with BF     Social Determinants of Health     Financial Resource Strain: Low Risk  (1/10/2024)    Overall Financial Resource Strain (CARDIA)     Difficulty of Paying Living Expenses: Not hard at all   Food Insecurity: Not on file (2025)   Transportation Needs: Unknown (1/10/2024)    PRAPARE - Transportation     Lack of

## 2025-01-13 NOTE — PATIENT INSTRUCTIONS
There are several FDA approved weight loss medications: Orlistat, Lorcaserin, Phenteramine-Toprimate, Bupropion-Naltrexone(Contrave) , Liraglutide, Wegovy, Zepbound.  Last 3 are also indicated for diabetes but they will be known as Victoza Ozempic and Mounjaro.    [email or web address]    We discussed stratagies to reduce weight. Specifically discussed the Keto and Intemmitant Fasting diet, the [place] diet and the Mediterranean diet. Discussed weight loss programs as well as exercise, although emphasized caloric restriction.   Consider keeping a food diary and seeing what you eat in a day and looking for where you can cut calories. Try taking a picture of everything you eat for a day. Phone apps can help witrh weight loss. Consider 'Lose It'  You must reduce liquid calories like soda and juices.  Weight Watchers or other weight loss programs can be very helpful.  Www.Super.com  Www.PNMsoft.com    There is no \"magic pill', but there some newly FDA approved medications for obesity.

## 2025-01-23 ENCOUNTER — TELEPHONE (OUTPATIENT)
Facility: CLINIC | Age: 40
End: 2025-01-23

## 2025-02-16 DIAGNOSIS — F90.0 ATTENTION DEFICIT HYPERACTIVITY DISORDER (ADHD), PREDOMINANTLY INATTENTIVE TYPE: ICD-10-CM

## 2025-02-17 RX ORDER — DEXTROAMPHETAMINE SACCHARATE, AMPHETAMINE ASPARTATE, DEXTROAMPHETAMINE SULFATE AND AMPHETAMINE SULFATE 7.5; 7.5; 7.5; 7.5 MG/1; MG/1; MG/1; MG/1
1 TABLET ORAL DAILY
Qty: 35 TABLET | Refills: 0 | Status: CANCELLED | OUTPATIENT
Start: 2025-02-17 | End: 2025-03-24

## 2025-03-05 ENCOUNTER — OFFICE VISIT (OUTPATIENT)
Facility: CLINIC | Age: 40
End: 2025-03-05
Payer: COMMERCIAL

## 2025-03-05 VITALS
SYSTOLIC BLOOD PRESSURE: 138 MMHG | HEIGHT: 63 IN | HEART RATE: 82 BPM | RESPIRATION RATE: 12 BRPM | OXYGEN SATURATION: 98 % | DIASTOLIC BLOOD PRESSURE: 83 MMHG | TEMPERATURE: 97.9 F | BODY MASS INDEX: 32.82 KG/M2 | WEIGHT: 185.2 LBS

## 2025-03-05 DIAGNOSIS — Z12.4 SCREENING FOR CERVICAL CANCER: ICD-10-CM

## 2025-03-05 DIAGNOSIS — Z12.31 ENCOUNTER FOR SCREENING MAMMOGRAM FOR MALIGNANT NEOPLASM OF BREAST: ICD-10-CM

## 2025-03-05 DIAGNOSIS — F31.81 BIPOLAR 2 DISORDER (HCC): ICD-10-CM

## 2025-03-05 DIAGNOSIS — F90.0 ATTENTION DEFICIT HYPERACTIVITY DISORDER (ADHD), PREDOMINANTLY INATTENTIVE TYPE: Primary | ICD-10-CM

## 2025-03-05 DIAGNOSIS — J30.1 NON-SEASONAL ALLERGIC RHINITIS DUE TO POLLEN: ICD-10-CM

## 2025-03-05 DIAGNOSIS — F41.9 ANXIETY: ICD-10-CM

## 2025-03-05 DIAGNOSIS — Z72.0 TOBACCO ABUSE: ICD-10-CM

## 2025-03-05 DIAGNOSIS — R68.2 DRY MOUTH: ICD-10-CM

## 2025-03-05 PROCEDURE — 99214 OFFICE O/P EST MOD 30 MIN: CPT | Performed by: FAMILY MEDICINE

## 2025-03-05 RX ORDER — FOLIC ACID 1 MG/1
1 TABLET ORAL DAILY
Qty: 100 TABLET | Refills: 2 | Status: SHIPPED | OUTPATIENT
Start: 2025-03-05

## 2025-03-05 RX ORDER — DEXTROAMPHETAMINE SACCHARATE, AMPHETAMINE ASPARTATE, DEXTROAMPHETAMINE SULFATE AND AMPHETAMINE SULFATE 7.5; 7.5; 7.5; 7.5 MG/1; MG/1; MG/1; MG/1
1 TABLET ORAL DAILY
Qty: 35 TABLET | Refills: 0 | Status: SHIPPED | OUTPATIENT
Start: 2025-03-05 | End: 2025-04-09

## 2025-03-05 RX ORDER — PILOCARPINE HYDROCHLORIDE 5 MG/1
5 TABLET, FILM COATED ORAL 3 TIMES DAILY
Qty: 300 TABLET | Refills: 2 | Status: SHIPPED | OUTPATIENT
Start: 2025-03-05

## 2025-03-05 RX ORDER — FLUTICASONE PROPIONATE 50 MCG
1 SPRAY, SUSPENSION (ML) NASAL DAILY
Qty: 32 G | Refills: 3 | Status: SHIPPED | OUTPATIENT
Start: 2025-03-05

## 2025-03-05 RX ORDER — CITALOPRAM HYDROBROMIDE 10 MG/1
10 TABLET ORAL DAILY
Qty: 100 TABLET | Refills: 2 | Status: SHIPPED | OUTPATIENT
Start: 2025-03-05

## 2025-03-05 RX ORDER — BUPROPION HYDROCHLORIDE 150 MG/1
150 TABLET, EXTENDED RELEASE ORAL 2 TIMES DAILY
Qty: 60 TABLET | Refills: 3 | Status: SHIPPED | OUTPATIENT
Start: 2025-03-05

## 2025-03-05 SDOH — ECONOMIC STABILITY: FOOD INSECURITY: WITHIN THE PAST 12 MONTHS, YOU WORRIED THAT YOUR FOOD WOULD RUN OUT BEFORE YOU GOT MONEY TO BUY MORE.: NEVER TRUE

## 2025-03-05 SDOH — ECONOMIC STABILITY: FOOD INSECURITY: WITHIN THE PAST 12 MONTHS, THE FOOD YOU BOUGHT JUST DIDN'T LAST AND YOU DIDN'T HAVE MONEY TO GET MORE.: NEVER TRUE

## 2025-03-05 ASSESSMENT — PATIENT HEALTH QUESTIONNAIRE - PHQ9
SUM OF ALL RESPONSES TO PHQ QUESTIONS 1-9: 0
3. TROUBLE FALLING OR STAYING ASLEEP: NOT AT ALL
8. MOVING OR SPEAKING SO SLOWLY THAT OTHER PEOPLE COULD HAVE NOTICED. OR THE OPPOSITE, BEING SO FIGETY OR RESTLESS THAT YOU HAVE BEEN MOVING AROUND A LOT MORE THAN USUAL: NOT AT ALL
SUM OF ALL RESPONSES TO PHQ QUESTIONS 1-9: 0
6. FEELING BAD ABOUT YOURSELF - OR THAT YOU ARE A FAILURE OR HAVE LET YOURSELF OR YOUR FAMILY DOWN: NOT AT ALL
10. IF YOU CHECKED OFF ANY PROBLEMS, HOW DIFFICULT HAVE THESE PROBLEMS MADE IT FOR YOU TO DO YOUR WORK, TAKE CARE OF THINGS AT HOME, OR GET ALONG WITH OTHER PEOPLE: NOT DIFFICULT AT ALL
SUM OF ALL RESPONSES TO PHQ QUESTIONS 1-9: 0
5. POOR APPETITE OR OVEREATING: NOT AT ALL
7. TROUBLE CONCENTRATING ON THINGS, SUCH AS READING THE NEWSPAPER OR WATCHING TELEVISION: NOT AT ALL
2. FEELING DOWN, DEPRESSED OR HOPELESS: NOT AT ALL
4. FEELING TIRED OR HAVING LITTLE ENERGY: NOT AT ALL
1. LITTLE INTEREST OR PLEASURE IN DOING THINGS: NOT AT ALL
9. THOUGHTS THAT YOU WOULD BE BETTER OFF DEAD, OR OF HURTING YOURSELF: NOT AT ALL
SUM OF ALL RESPONSES TO PHQ QUESTIONS 1-9: 0

## 2025-03-05 NOTE — PROGRESS NOTES
\"Have you been to the ER, urgent care clinic since your last visit?  Hospitalized since your last visit?\"    NO    “Have you seen or consulted any other health care providers outside our system since your last visit?”    NO    Have you had a mammogram?”   NO - Never    No breast cancer screening on file      “Have you had a pap smear?”    NO    Date of last Cervical Cancer screen (HPV or PAP): 6/13/2018              
electronic signature was used to authenticate this note.    --Faisal Webb MD

## 2025-03-07 ENCOUNTER — CLINICAL DOCUMENTATION (OUTPATIENT)
Facility: CLINIC | Age: 40
End: 2025-03-07

## 2025-03-17 DIAGNOSIS — R68.2 DRY MOUTH: ICD-10-CM

## 2025-03-17 DIAGNOSIS — F41.9 ANXIETY: ICD-10-CM

## 2025-03-21 RX ORDER — FOLIC ACID 1 MG/1
1 TABLET ORAL DAILY
Qty: 100 TABLET | Refills: 1 | Status: SHIPPED | OUTPATIENT
Start: 2025-03-21

## 2025-03-21 RX ORDER — PILOCARPINE HYDROCHLORIDE 5 MG/1
5 TABLET, FILM COATED ORAL 3 TIMES DAILY
Qty: 300 TABLET | Refills: 1 | Status: SHIPPED | OUTPATIENT
Start: 2025-03-21

## 2025-03-21 RX ORDER — CITALOPRAM HYDROBROMIDE 10 MG/1
10 TABLET ORAL DAILY
Qty: 100 TABLET | Refills: 3 | Status: SHIPPED | OUTPATIENT
Start: 2025-03-21

## 2025-03-30 DIAGNOSIS — F90.0 ATTENTION DEFICIT HYPERACTIVITY DISORDER (ADHD), PREDOMINANTLY INATTENTIVE TYPE: ICD-10-CM

## 2025-03-31 RX ORDER — DEXTROAMPHETAMINE SACCHARATE, AMPHETAMINE ASPARTATE, DEXTROAMPHETAMINE SULFATE AND AMPHETAMINE SULFATE 7.5; 7.5; 7.5; 7.5 MG/1; MG/1; MG/1; MG/1
1 TABLET ORAL DAILY
Qty: 35 TABLET | Refills: 0 | Status: SHIPPED | OUTPATIENT
Start: 2025-03-31 | End: 2025-05-05

## 2025-05-04 NOTE — PROGRESS NOTES
Brian Grullon (:  1985) is a 39 y.o. female, Established patient, here for evaluation of the following chief complaint(s): rf ADHD med  Medication Refill (Pt presents with no concerns today. Discuss getting mammo scheduled.)         Assessment & Plan  1. Attention deficit hyperactivity disorder (ADHD).  - The patient reports that her current dose of Adderall wears off sooner than desired.  - Instead of increasing the Adderall dosage, guanfacine (Intuniv) will be added to her regimen. She is advised to start with one tablet daily for a week, then increase to two tablets daily. If there is no improvement after two weeks, she can increase to three tablets daily.  - Potential side effects, including dry mouth, fatigue, headache, and dizziness, were discussed. If severe side effects occur, she should discontinue the medication.  - A prescription for Adderall will be refilled at the pharmacy.    2. Medication management.  - She is currently taking citalopram daily in the morning and reports that it is working well for her mood.  - She is also taking pilocarpine tablets and gabapentin.  - She has not started Zepbound due to cost and insurance issues.    3. Health maintenance.  - A referral to a gynecologist will be made for a Pap smear and mammogram.  - She is advised to call the hospital to schedule her mammogram.    Follow-up  - The patient will follow up in 2 months.    Results    1. Bipolar 2 disorder (HCC)  2. Attention deficit hyperactivity disorder (ADHD), predominantly inattentive type  -     amphetamine-dextroamphetamine (ADDERALL) 30 MG tablet; Take 1 tablet by mouth daily for 35 days. Max Daily Amount: 1 tablet, Disp-35 tablet, R-0Normal  -     guanFACINE (TENEX) 1 MG tablet; Take 2 tablets by mouth daily Start 1 daily increase as tolerated, Disp-60 tablet, R-3Normal  3. Caregiver burden  4. Anxiety  -     citalopram (CELEXA) 10 MG tablet; Take 1 tablet by mouth daily, Disp-100 tablet, R-1Normal  5.

## 2025-05-05 ENCOUNTER — OFFICE VISIT (OUTPATIENT)
Facility: CLINIC | Age: 40
End: 2025-05-05
Payer: COMMERCIAL

## 2025-05-05 VITALS
DIASTOLIC BLOOD PRESSURE: 79 MMHG | WEIGHT: 180.2 LBS | HEART RATE: 90 BPM | BODY MASS INDEX: 31.93 KG/M2 | HEIGHT: 63 IN | OXYGEN SATURATION: 97 % | SYSTOLIC BLOOD PRESSURE: 119 MMHG

## 2025-05-05 DIAGNOSIS — Z63.6 CAREGIVER BURDEN: ICD-10-CM

## 2025-05-05 DIAGNOSIS — F90.0 ATTENTION DEFICIT HYPERACTIVITY DISORDER (ADHD), PREDOMINANTLY INATTENTIVE TYPE: ICD-10-CM

## 2025-05-05 DIAGNOSIS — Z12.31 ENCOUNTER FOR SCREENING MAMMOGRAM FOR MALIGNANT NEOPLASM OF BREAST: ICD-10-CM

## 2025-05-05 DIAGNOSIS — Z12.4 SCREENING FOR CERVICAL CANCER: ICD-10-CM

## 2025-05-05 DIAGNOSIS — F41.9 ANXIETY: ICD-10-CM

## 2025-05-05 DIAGNOSIS — R68.2 DRY MOUTH: ICD-10-CM

## 2025-05-05 DIAGNOSIS — F31.81 BIPOLAR 2 DISORDER (HCC): Primary | ICD-10-CM

## 2025-05-05 PROCEDURE — 99214 OFFICE O/P EST MOD 30 MIN: CPT | Performed by: FAMILY MEDICINE

## 2025-05-05 RX ORDER — CITALOPRAM HYDROBROMIDE 10 MG/1
10 TABLET ORAL DAILY
Qty: 100 TABLET | Refills: 1 | Status: SHIPPED | OUTPATIENT
Start: 2025-05-05

## 2025-05-05 RX ORDER — GUANFACINE 1 MG/1
2 TABLET ORAL DAILY
Qty: 60 TABLET | Refills: 3 | Status: SHIPPED | OUTPATIENT
Start: 2025-05-05

## 2025-05-05 RX ORDER — DEXTROAMPHETAMINE SACCHARATE, AMPHETAMINE ASPARTATE, DEXTROAMPHETAMINE SULFATE AND AMPHETAMINE SULFATE 7.5; 7.5; 7.5; 7.5 MG/1; MG/1; MG/1; MG/1
1 TABLET ORAL DAILY
Qty: 35 TABLET | Refills: 0 | Status: SHIPPED | OUTPATIENT
Start: 2025-05-05 | End: 2025-06-09

## 2025-05-05 RX ORDER — PILOCARPINE HYDROCHLORIDE 5 MG/1
5 TABLET, FILM COATED ORAL 3 TIMES DAILY
Qty: 300 TABLET | Refills: 1 | Status: SHIPPED | OUTPATIENT
Start: 2025-05-05

## 2025-05-05 SDOH — SOCIAL STABILITY - SOCIAL INSECURITY: DEPENDENT RELATIVE NEEDING CARE AT HOME: Z63.6

## 2025-05-05 ASSESSMENT — PATIENT HEALTH QUESTIONNAIRE - PHQ9
SUM OF ALL RESPONSES TO PHQ QUESTIONS 1-9: 0
4. FEELING TIRED OR HAVING LITTLE ENERGY: NOT AT ALL
10. IF YOU CHECKED OFF ANY PROBLEMS, HOW DIFFICULT HAVE THESE PROBLEMS MADE IT FOR YOU TO DO YOUR WORK, TAKE CARE OF THINGS AT HOME, OR GET ALONG WITH OTHER PEOPLE: NOT DIFFICULT AT ALL
SUM OF ALL RESPONSES TO PHQ QUESTIONS 1-9: 0
8. MOVING OR SPEAKING SO SLOWLY THAT OTHER PEOPLE COULD HAVE NOTICED. OR THE OPPOSITE, BEING SO FIGETY OR RESTLESS THAT YOU HAVE BEEN MOVING AROUND A LOT MORE THAN USUAL: NOT AT ALL
5. POOR APPETITE OR OVEREATING: NOT AT ALL
3. TROUBLE FALLING OR STAYING ASLEEP: NOT AT ALL
SUM OF ALL RESPONSES TO PHQ QUESTIONS 1-9: 0
9. THOUGHTS THAT YOU WOULD BE BETTER OFF DEAD, OR OF HURTING YOURSELF: NOT AT ALL
6. FEELING BAD ABOUT YOURSELF - OR THAT YOU ARE A FAILURE OR HAVE LET YOURSELF OR YOUR FAMILY DOWN: NOT AT ALL
SUM OF ALL RESPONSES TO PHQ QUESTIONS 1-9: 0
1. LITTLE INTEREST OR PLEASURE IN DOING THINGS: NOT AT ALL
7. TROUBLE CONCENTRATING ON THINGS, SUCH AS READING THE NEWSPAPER OR WATCHING TELEVISION: NOT AT ALL
2. FEELING DOWN, DEPRESSED OR HOPELESS: NOT AT ALL

## 2025-05-05 NOTE — PROGRESS NOTES
Have you been to the ER, urgent care clinic since your last visit?  Hospitalized since your last visit?   NO    Have you seen or consulted any other health care providers outside our system since your last visit?   NO    Have you had a mammogram?”   NO Never    No breast cancer screening on file      “Have you had a pap smear?”    NO    Date of last Cervical Cancer screen (HPV or PAP): 6/13/2018              Chief Complaint   Patient presents with    Medication Refill     Pt presents with no concerns today

## 2025-06-22 DIAGNOSIS — F90.0 ATTENTION DEFICIT HYPERACTIVITY DISORDER (ADHD), PREDOMINANTLY INATTENTIVE TYPE: ICD-10-CM

## 2025-06-26 RX ORDER — DEXTROAMPHETAMINE SACCHARATE, AMPHETAMINE ASPARTATE, DEXTROAMPHETAMINE SULFATE AND AMPHETAMINE SULFATE 7.5; 7.5; 7.5; 7.5 MG/1; MG/1; MG/1; MG/1
1 TABLET ORAL DAILY
Qty: 35 TABLET | Refills: 0 | Status: SHIPPED | OUTPATIENT
Start: 2025-06-26 | End: 2025-07-31

## 2025-07-08 ENCOUNTER — OFFICE VISIT (OUTPATIENT)
Facility: CLINIC | Age: 40
End: 2025-07-08
Payer: COMMERCIAL

## 2025-07-08 VITALS
BODY MASS INDEX: 30.33 KG/M2 | OXYGEN SATURATION: 98 % | HEART RATE: 60 BPM | WEIGHT: 171.2 LBS | SYSTOLIC BLOOD PRESSURE: 122 MMHG | DIASTOLIC BLOOD PRESSURE: 82 MMHG | HEIGHT: 63 IN | RESPIRATION RATE: 14 BRPM | TEMPERATURE: 98.1 F

## 2025-07-08 DIAGNOSIS — F31.81 BIPOLAR 2 DISORDER (HCC): Primary | ICD-10-CM

## 2025-07-08 DIAGNOSIS — L30.9 ECZEMA, UNSPECIFIED TYPE: ICD-10-CM

## 2025-07-08 DIAGNOSIS — F90.0 ATTENTION DEFICIT HYPERACTIVITY DISORDER (ADHD), PREDOMINANTLY INATTENTIVE TYPE: ICD-10-CM

## 2025-07-08 PROCEDURE — 99214 OFFICE O/P EST MOD 30 MIN: CPT | Performed by: FAMILY MEDICINE

## 2025-07-08 RX ORDER — GUANFACINE 1 MG/1
1 TABLET ORAL 2 TIMES DAILY PRN
Qty: 200 TABLET | Refills: 1 | Status: SHIPPED | OUTPATIENT
Start: 2025-07-08

## 2025-07-08 RX ORDER — BETAMETHASONE DIPROPIONATE 0.05 %
OINTMENT (GRAM) TOPICAL DAILY
Qty: 50 G | Refills: 3 | Status: SHIPPED | OUTPATIENT
Start: 2025-07-08

## 2025-07-08 RX ORDER — DEXTROAMPHETAMINE SACCHARATE, AMPHETAMINE ASPARTATE, DEXTROAMPHETAMINE SULFATE AND AMPHETAMINE SULFATE 7.5; 7.5; 7.5; 7.5 MG/1; MG/1; MG/1; MG/1
1 TABLET ORAL DAILY
Qty: 35 TABLET | Refills: 0 | Status: SHIPPED | OUTPATIENT
Start: 2025-07-08 | End: 2025-08-12

## 2025-07-08 ASSESSMENT — PATIENT HEALTH QUESTIONNAIRE - PHQ9
3. TROUBLE FALLING OR STAYING ASLEEP: NOT AT ALL
1. LITTLE INTEREST OR PLEASURE IN DOING THINGS: NOT AT ALL
9. THOUGHTS THAT YOU WOULD BE BETTER OFF DEAD, OR OF HURTING YOURSELF: NOT AT ALL
2. FEELING DOWN, DEPRESSED OR HOPELESS: NOT AT ALL
SUM OF ALL RESPONSES TO PHQ QUESTIONS 1-9: 0
8. MOVING OR SPEAKING SO SLOWLY THAT OTHER PEOPLE COULD HAVE NOTICED. OR THE OPPOSITE, BEING SO FIGETY OR RESTLESS THAT YOU HAVE BEEN MOVING AROUND A LOT MORE THAN USUAL: NOT AT ALL
SUM OF ALL RESPONSES TO PHQ QUESTIONS 1-9: 0
SUM OF ALL RESPONSES TO PHQ QUESTIONS 1-9: 0
5. POOR APPETITE OR OVEREATING: NOT AT ALL
10. IF YOU CHECKED OFF ANY PROBLEMS, HOW DIFFICULT HAVE THESE PROBLEMS MADE IT FOR YOU TO DO YOUR WORK, TAKE CARE OF THINGS AT HOME, OR GET ALONG WITH OTHER PEOPLE: NOT DIFFICULT AT ALL
SUM OF ALL RESPONSES TO PHQ QUESTIONS 1-9: 0
7. TROUBLE CONCENTRATING ON THINGS, SUCH AS READING THE NEWSPAPER OR WATCHING TELEVISION: NOT AT ALL
6. FEELING BAD ABOUT YOURSELF - OR THAT YOU ARE A FAILURE OR HAVE LET YOURSELF OR YOUR FAMILY DOWN: NOT AT ALL
4. FEELING TIRED OR HAVING LITTLE ENERGY: NOT AT ALL

## 2025-07-08 NOTE — PROGRESS NOTES
Have you been to the ER, urgent care clinic since your last visit?  Hospitalized since your last visit?   NO    Have you seen or consulted any other health care providers outside our system since your last visit?   NO    Have you had a mammogram?”   NO    No breast cancer screening on file      “Have you had a pap smear?”    NO    Date of last Cervical Cancer screen (HPV or PAP): 6/13/2018                Chief Complaint   Patient presents with    Rash     On hands, under her arms, and ankles - very itchy        
SpO2 98%   BMI 30.33 kg/m²   Physical Exam  Respiratory: Clear to auscultation, no wheezing, rales or rhonchi  Cardiovascular: Regular rate and rhythm, no murmurs, rubs, or gallops  Skin: Various papules noted on the extensor surfaces         The patient (or guardian, if applicable) and other individuals in attendance with the patient were advised that Artificial Intelligence will be utilized during this visit to record and process the conversation to generate a clinical note. The patient (or guardian, if applicable) and other individuals in attendance at the appointment consented to the use of AI, including the recording.      An electronic signature was used to authenticate this note.    --Faisal Webb MD

## 2025-07-14 DIAGNOSIS — F90.0 ATTENTION DEFICIT HYPERACTIVITY DISORDER (ADHD), PREDOMINANTLY INATTENTIVE TYPE: ICD-10-CM

## 2025-07-14 RX ORDER — GUANFACINE 1 MG/1
1 TABLET ORAL 2 TIMES DAILY PRN
Qty: 200 TABLET | Refills: 1 | Status: SHIPPED | OUTPATIENT
Start: 2025-07-14

## 2025-07-17 DIAGNOSIS — F41.9 ANXIETY: ICD-10-CM

## 2025-07-17 DIAGNOSIS — L30.9 ECZEMA, UNSPECIFIED TYPE: ICD-10-CM

## 2025-07-18 RX ORDER — CITALOPRAM HYDROBROMIDE 10 MG/1
10 TABLET ORAL DAILY
Qty: 100 TABLET | Refills: 1 | Status: SHIPPED | OUTPATIENT
Start: 2025-07-18

## 2025-07-18 RX ORDER — BETAMETHASONE DIPROPIONATE 0.05 %
OINTMENT (GRAM) TOPICAL
Qty: 45 G | Refills: 3 | Status: SHIPPED | OUTPATIENT
Start: 2025-07-18

## 2025-07-18 NOTE — TELEPHONE ENCOUNTER
PCP: Faisal Webb MD    LAST APPT:7/8/2025    Future Appointments   Date Time Provider Department Center   8/13/2025  1:45 PM Bev Mendoza PA [facility]   8/25/2025  9:45 AM Faisal Webb MD [facility] DEP       Requested Prescriptions     Pending Prescriptions Disp Refills    betamethasone dipropionate 0.05 % ointment [Pharmacy Med Name: betamethasone dipropionate 0.05 % topical ointment] 45 g 1     Sig: APPLY TOPICALLY DAILY TO SKIN RASH    citalopram (CELEXA) 10 MG tablet [Pharmacy Med Name: citalopram 10 mg tablet] 30 tablet 1     Sig: TAKE 1 TABLET EVERY DAY

## 2025-08-25 PROBLEM — G62.9 NEUROPATHY: Status: ACTIVE | Noted: 2025-08-25

## 2025-08-26 ENCOUNTER — OFFICE VISIT (OUTPATIENT)
Facility: CLINIC | Age: 40
End: 2025-08-26
Payer: COMMERCIAL

## 2025-08-26 VITALS
RESPIRATION RATE: 14 BRPM | WEIGHT: 165.6 LBS | TEMPERATURE: 98.6 F | OXYGEN SATURATION: 98 % | HEART RATE: 86 BPM | HEIGHT: 63 IN | BODY MASS INDEX: 29.34 KG/M2 | DIASTOLIC BLOOD PRESSURE: 79 MMHG | SYSTOLIC BLOOD PRESSURE: 116 MMHG

## 2025-08-26 DIAGNOSIS — F41.9 ANXIETY: ICD-10-CM

## 2025-08-26 DIAGNOSIS — R68.2 DRY MOUTH: ICD-10-CM

## 2025-08-26 DIAGNOSIS — Z63.6 CAREGIVER BURDEN: ICD-10-CM

## 2025-08-26 DIAGNOSIS — F31.81 BIPOLAR 2 DISORDER (HCC): ICD-10-CM

## 2025-08-26 DIAGNOSIS — J30.1 NON-SEASONAL ALLERGIC RHINITIS DUE TO POLLEN: ICD-10-CM

## 2025-08-26 DIAGNOSIS — G62.9 NEUROPATHY: ICD-10-CM

## 2025-08-26 DIAGNOSIS — F90.0 ATTENTION DEFICIT HYPERACTIVITY DISORDER (ADHD), PREDOMINANTLY INATTENTIVE TYPE: Primary | ICD-10-CM

## 2025-08-26 DIAGNOSIS — L30.9 ECZEMA, UNSPECIFIED TYPE: ICD-10-CM

## 2025-08-26 PROCEDURE — 99214 OFFICE O/P EST MOD 30 MIN: CPT | Performed by: FAMILY MEDICINE

## 2025-08-26 RX ORDER — CEPHALEXIN 500 MG/1
500 CAPSULE ORAL 3 TIMES DAILY
Qty: 21 CAPSULE | Refills: 0 | Status: SHIPPED | OUTPATIENT
Start: 2025-08-26 | End: 2025-09-02

## 2025-08-26 RX ORDER — PILOCARPINE HYDROCHLORIDE 5 MG/1
5 TABLET, FILM COATED ORAL 3 TIMES DAILY
Qty: 300 TABLET | Refills: 1 | Status: SHIPPED | OUTPATIENT
Start: 2025-08-26

## 2025-08-26 RX ORDER — FLUTICASONE PROPIONATE 50 MCG
1 SPRAY, SUSPENSION (ML) NASAL DAILY
Qty: 32 G | Refills: 3 | Status: SHIPPED | OUTPATIENT
Start: 2025-08-26

## 2025-08-26 RX ORDER — CITALOPRAM HYDROBROMIDE 10 MG/1
10 TABLET ORAL DAILY
Qty: 100 TABLET | Refills: 1 | Status: SHIPPED | OUTPATIENT
Start: 2025-08-26

## 2025-08-26 RX ORDER — FOLIC ACID 1 MG/1
1 TABLET ORAL DAILY
Qty: 100 TABLET | Refills: 1 | Status: SHIPPED | OUTPATIENT
Start: 2025-08-26

## 2025-08-26 RX ORDER — BETAMETHASONE DIPROPIONATE 0.05 %
OINTMENT (GRAM) TOPICAL
Qty: 45 G | Refills: 3 | Status: SHIPPED | OUTPATIENT
Start: 2025-08-26

## 2025-08-26 RX ORDER — DEXTROAMPHETAMINE SACCHARATE, AMPHETAMINE ASPARTATE, DEXTROAMPHETAMINE SULFATE AND AMPHETAMINE SULFATE 7.5; 7.5; 7.5; 7.5 MG/1; MG/1; MG/1; MG/1
1 TABLET ORAL DAILY
Qty: 35 TABLET | Refills: 0 | Status: SHIPPED | OUTPATIENT
Start: 2025-08-26 | End: 2025-09-30

## 2025-08-26 SDOH — SOCIAL STABILITY - SOCIAL INSECURITY: DEPENDENT RELATIVE NEEDING CARE AT HOME: Z63.6

## 2025-08-26 ASSESSMENT — PATIENT HEALTH QUESTIONNAIRE - PHQ9
6. FEELING BAD ABOUT YOURSELF - OR THAT YOU ARE A FAILURE OR HAVE LET YOURSELF OR YOUR FAMILY DOWN: NOT AT ALL
SUM OF ALL RESPONSES TO PHQ QUESTIONS 1-9: 0
3. TROUBLE FALLING OR STAYING ASLEEP: NOT AT ALL
1. LITTLE INTEREST OR PLEASURE IN DOING THINGS: NOT AT ALL
8. MOVING OR SPEAKING SO SLOWLY THAT OTHER PEOPLE COULD HAVE NOTICED. OR THE OPPOSITE, BEING SO FIGETY OR RESTLESS THAT YOU HAVE BEEN MOVING AROUND A LOT MORE THAN USUAL: NOT AT ALL
10. IF YOU CHECKED OFF ANY PROBLEMS, HOW DIFFICULT HAVE THESE PROBLEMS MADE IT FOR YOU TO DO YOUR WORK, TAKE CARE OF THINGS AT HOME, OR GET ALONG WITH OTHER PEOPLE: NOT DIFFICULT AT ALL
7. TROUBLE CONCENTRATING ON THINGS, SUCH AS READING THE NEWSPAPER OR WATCHING TELEVISION: NOT AT ALL
5. POOR APPETITE OR OVEREATING: NOT AT ALL
SUM OF ALL RESPONSES TO PHQ QUESTIONS 1-9: 0
SUM OF ALL RESPONSES TO PHQ QUESTIONS 1-9: 0
4. FEELING TIRED OR HAVING LITTLE ENERGY: NOT AT ALL
9. THOUGHTS THAT YOU WOULD BE BETTER OFF DEAD, OR OF HURTING YOURSELF: NOT AT ALL
SUM OF ALL RESPONSES TO PHQ QUESTIONS 1-9: 0
2. FEELING DOWN, DEPRESSED OR HOPELESS: NOT AT ALL

## 2025-08-26 ASSESSMENT — LIFESTYLE VARIABLES
HOW MANY STANDARD DRINKS CONTAINING ALCOHOL DO YOU HAVE ON A TYPICAL DAY: PATIENT DOES NOT DRINK
HOW OFTEN DO YOU HAVE A DRINK CONTAINING ALCOHOL: NEVER

## 2025-08-27 DIAGNOSIS — F90.0 ATTENTION DEFICIT HYPERACTIVITY DISORDER (ADHD), PREDOMINANTLY INATTENTIVE TYPE: ICD-10-CM

## 2025-08-27 LAB
AMPHET UR QL SCN: POSITIVE
BARBITURATES UR QL SCN: NEGATIVE
BENZODIAZ UR QL: NEGATIVE
CANNABINOIDS UR QL SCN: POSITIVE
COCAINE UR QL SCN: NEGATIVE
FENTANYL: NEGATIVE
Lab: ABNORMAL
METHADONE UR QL: NEGATIVE
OPIATES UR QL: NEGATIVE
PCP UR QL: NEGATIVE